# Patient Record
Sex: FEMALE | Race: WHITE | NOT HISPANIC OR LATINO | Employment: UNEMPLOYED | URBAN - METROPOLITAN AREA
[De-identification: names, ages, dates, MRNs, and addresses within clinical notes are randomized per-mention and may not be internally consistent; named-entity substitution may affect disease eponyms.]

---

## 2023-09-07 PROBLEM — O16.9 ELEVATED BLOOD PRESSURE AFFECTING PREGNANCY, ANTEPARTUM (HHS-HCC): Status: ACTIVE | Noted: 2023-09-07

## 2023-09-07 PROBLEM — N91.2 AMENORRHEA: Status: ACTIVE | Noted: 2023-09-07

## 2023-09-07 PROBLEM — Z86.32 HISTORY OF GESTATIONAL DIABETES: Status: ACTIVE | Noted: 2023-09-07

## 2023-09-07 PROBLEM — J02.9 EXUDATIVE PHARYNGITIS: Status: ACTIVE | Noted: 2023-09-07

## 2023-09-07 PROBLEM — O99.340 ANXIETY IN PREGNANCY, ANTEPARTUM (HHS-HCC): Status: ACTIVE | Noted: 2023-09-07

## 2023-09-07 PROBLEM — O24.414 INSULIN CONTROLLED GESTATIONAL DIABETES MELLITUS (GDM) DURING PREGNANCY, ANTEPARTUM (HHS-HCC): Status: ACTIVE | Noted: 2023-09-07

## 2023-09-07 PROBLEM — I82.409 DEEP VENOUS THROMBOSIS (MULTI): Status: ACTIVE | Noted: 2023-09-07

## 2023-09-07 PROBLEM — F41.9 ANXIETY IN PREGNANCY, ANTEPARTUM (HHS-HCC): Status: ACTIVE | Noted: 2023-09-07

## 2023-09-07 PROBLEM — D68.59 OTHER PRIMARY THROMBOPHILIA (MULTI): Status: ACTIVE | Noted: 2023-09-07

## 2023-09-07 PROBLEM — F41.8 DEPRESSION WITH ANXIETY: Status: ACTIVE | Noted: 2023-09-07

## 2023-09-07 PROBLEM — N92.0 EXCESSIVE AND FREQUENT MENSTRUATION WITH REGULAR CYCLE: Status: ACTIVE | Noted: 2023-09-07

## 2023-09-07 PROBLEM — D72.819 LEUKOPENIA: Status: ACTIVE | Noted: 2023-09-07

## 2023-09-07 PROBLEM — O22.30: Status: ACTIVE | Noted: 2023-09-07

## 2023-09-07 PROBLEM — O99.342 DEPRESSION AFFECTING PREGNANCY IN SECOND TRIMESTER, ANTEPARTUM (HHS-HCC): Status: ACTIVE | Noted: 2023-09-07

## 2023-09-07 PROBLEM — N92.1 MENOMETRORRHAGIA: Status: ACTIVE | Noted: 2023-09-07

## 2023-09-07 PROBLEM — O35.BXX0 ABNORMAL FETAL ECHOCARDIOGRAM AFFECTING ANTEPARTUM CARE OF MOTHER (HHS-HCC): Status: ACTIVE | Noted: 2023-09-07

## 2023-09-07 PROBLEM — E66.01 MORBID (SEVERE) OBESITY DUE TO EXCESS CALORIES (MULTI): Status: ACTIVE | Noted: 2023-09-07

## 2023-09-07 PROBLEM — F32.A DEPRESSION AFFECTING PREGNANCY IN SECOND TRIMESTER, ANTEPARTUM (HHS-HCC): Status: ACTIVE | Noted: 2023-09-07

## 2023-09-07 PROBLEM — D68.59 ANTITHROMBIN III DEFICIENCY (MULTI): Status: ACTIVE | Noted: 2023-09-07

## 2023-09-07 PROBLEM — M95.8 OSTEOCHONDRAL DEFECT OF FEMORAL CONDYLE: Status: ACTIVE | Noted: 2023-09-07

## 2023-09-07 PROBLEM — H92.09 OTALGIA: Status: ACTIVE | Noted: 2023-09-07

## 2023-09-07 PROBLEM — S83.005D PATELLAR DISLOCATION, LEFT, SUBSEQUENT ENCOUNTER: Status: ACTIVE | Noted: 2023-09-07

## 2023-09-07 RX ORDER — FLUOXETINE HYDROCHLORIDE 40 MG/1
1 CAPSULE ORAL DAILY
COMMUNITY

## 2023-09-07 RX ORDER — FERROUS SULFATE 325(65) MG
65 TABLET ORAL DAILY
COMMUNITY

## 2023-09-07 RX ORDER — BUPROPION HYDROCHLORIDE 200 MG/1
1 TABLET, EXTENDED RELEASE ORAL 2 TIMES DAILY
COMMUNITY
Start: 2022-03-16

## 2023-09-20 LAB
ANION GAP IN SER/PLAS: 14 MMOL/L (ref 10–20)
BASOPHILS (10*3/UL) IN BLOOD BY AUTOMATED COUNT: 0.04 X10E9/L (ref 0–0.1)
BASOPHILS/100 LEUKOCYTES IN BLOOD BY AUTOMATED COUNT: 0.7 % (ref 0–2)
CALCIUM (MG/DL) IN SER/PLAS: 9 MG/DL (ref 8.6–10.3)
CARBON DIOXIDE, TOTAL (MMOL/L) IN SER/PLAS: 26 MMOL/L (ref 21–32)
CHLORIDE (MMOL/L) IN SER/PLAS: 102 MMOL/L (ref 98–107)
CREATININE (MG/DL) IN SER/PLAS: 0.66 MG/DL (ref 0.5–1.05)
EOSINOPHILS (10*3/UL) IN BLOOD BY AUTOMATED COUNT: 0.16 X10E9/L (ref 0–0.7)
EOSINOPHILS/100 LEUKOCYTES IN BLOOD BY AUTOMATED COUNT: 2.8 % (ref 0–6)
ERYTHROCYTE DISTRIBUTION WIDTH (RATIO) BY AUTOMATED COUNT: 14.1 % (ref 11.5–14.5)
ERYTHROCYTE MEAN CORPUSCULAR HEMOGLOBIN CONCENTRATION (G/DL) BY AUTOMATED: 31.3 G/DL (ref 32–36)
ERYTHROCYTE MEAN CORPUSCULAR VOLUME (FL) BY AUTOMATED COUNT: 79 FL (ref 80–100)
ERYTHROCYTES (10*6/UL) IN BLOOD BY AUTOMATED COUNT: 5.19 X10E12/L (ref 4–5.2)
GFR FEMALE: >90 ML/MIN/1.73M2
GLUCOSE (MG/DL) IN SER/PLAS: 118 MG/DL (ref 74–99)
HEMATOCRIT (%) IN BLOOD BY AUTOMATED COUNT: 41.2 % (ref 36–46)
HEMOGLOBIN (G/DL) IN BLOOD: 12.9 G/DL (ref 12–16)
IMMATURE GRANULOCYTES/100 LEUKOCYTES IN BLOOD BY AUTOMATED COUNT: 0.5 % (ref 0–0.9)
LEUKOCYTES (10*3/UL) IN BLOOD BY AUTOMATED COUNT: 5.6 X10E9/L (ref 4.4–11.3)
LYMPHOCYTES (10*3/UL) IN BLOOD BY AUTOMATED COUNT: 1.86 X10E9/L (ref 1.2–4.8)
LYMPHOCYTES/100 LEUKOCYTES IN BLOOD BY AUTOMATED COUNT: 33 % (ref 13–44)
MONOCYTES (10*3/UL) IN BLOOD BY AUTOMATED COUNT: 0.33 X10E9/L (ref 0.1–1)
MONOCYTES/100 LEUKOCYTES IN BLOOD BY AUTOMATED COUNT: 5.9 % (ref 2–10)
NEUTROPHILS (10*3/UL) IN BLOOD BY AUTOMATED COUNT: 3.21 X10E9/L (ref 1.2–7.7)
NEUTROPHILS/100 LEUKOCYTES IN BLOOD BY AUTOMATED COUNT: 57.1 % (ref 40–80)
PLATELETS (10*3/UL) IN BLOOD AUTOMATED COUNT: 294 X10E9/L (ref 150–450)
POTASSIUM (MMOL/L) IN SER/PLAS: 4.3 MMOL/L (ref 3.5–5.3)
SODIUM (MMOL/L) IN SER/PLAS: 138 MMOL/L (ref 136–145)
UREA NITROGEN (MG/DL) IN SER/PLAS: 13 MG/DL (ref 6–23)

## 2023-09-27 ENCOUNTER — HOSPITAL ENCOUNTER (OUTPATIENT)
Dept: DATA CONVERSION | Facility: HOSPITAL | Age: 28
End: 2023-09-27
Attending: OBSTETRICS & GYNECOLOGY | Admitting: OBSTETRICS & GYNECOLOGY
Payer: COMMERCIAL

## 2023-09-27 DIAGNOSIS — N93.9 ABNORMAL UTERINE AND VAGINAL BLEEDING, UNSPECIFIED: ICD-10-CM

## 2023-09-27 DIAGNOSIS — D27.0 BENIGN NEOPLASM OF RIGHT OVARY: ICD-10-CM

## 2023-09-27 LAB
ABO GROUP (TYPE) IN BLOOD: NORMAL
ANTIBODY SCREEN: NORMAL
CHORIOGONADOTROPIN (MIU/ML) IN SER/PLAS: <2 MIU/ML
RH FACTOR: NORMAL

## 2023-09-29 VITALS
HEART RATE: 86 BPM | TEMPERATURE: 97.5 F | WEIGHT: 266.76 LBS | HEIGHT: 63 IN | BODY MASS INDEX: 47.27 KG/M2 | RESPIRATION RATE: 18 BRPM | DIASTOLIC BLOOD PRESSURE: 93 MMHG | SYSTOLIC BLOOD PRESSURE: 146 MMHG

## 2023-09-30 NOTE — H&P
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 21, recurrent DVT during pregnancy; suspected HOWARD - no sleep study; depression/anxiety, iron deficiency anemia  PSH: C-sections x2, ; L knee reconstruction   Med: eliquis 5mg BID, fluoxetine 40mg, multivitamin, PO iron, wellbutrin XL 300mg  All: NKDA  FH: unknown - adopted  Soc: denies x3    Allergies:        Allergies:  ·  No Known Allergies :     Home Medication Review:   Home Medications Reviewed: yes     Impression/Procedure:   ·  Impression and Planned Procedure: total robotic assisted hysterectomy and bilateral salpingectomy       ERAS (Enhanced Recovery After Surgery):  ·  ERAS Patient: no       Vital Signs:  Temperature C: 36.4 degrees C   Temperature F: 97.5 degrees F   Heart Rate: 86 beats per minute   Respiratory Rate: 18 breath per minute   Blood Pressure Systolic: 146 mm/Hg   Blood Pressure Diastolic: 93 mm/Hg     Physical Exam by System:    Constitutional: NAD   Eyes: Nonicteric   ENMT: MMM   Head/Neck: NCAT   Respiratory/Thorax: Breathing well on RA   Cardiovascular: Hemodynamically stable   Musculoskeletal: ROM grossly normal   Extremities: No LE edema   Neurological: Grossly intact   Psychological: Appropriate affect   Skin: WWP     Consent:   COVID-19 Consent:  ·  COVID-19 Risk Consent Surgeon has reviewed key risks related to the risk of halina COVID-19 and if they contract COVID-19 what the risks are.     Attestation:   Note Completion:  I am a:   Resident/Fellow   Attending Attestation I saw and evaluated the patient.  I personally obtained the key and critical portions of the history and physical exam or was physically present for key and  critical portions performed by the resident/fellow. I reviewed the resident/fellow?s documentation and discussed the patient with the resident/fellow.  I agree with the resident/fellow?s medical decision making as documented in the note.     I personally evaluated the patient on 27-Sep-2023         Electronic Signatures:  Marc Mccormack ()  (Signed 27-Sep-2023 12:35)   Authored: Note Completion   Co-Signer: History of Present Illness, Allergies, Home Medication Review, Impression/Procedure, ERAS, Physical Exam, Consent, Note Completion  Megan Mustafa (Resident))  (Signed 27-Sep-2023 11:54)   Authored: History of Present Illness, Allergies, Home  Medication Review, Impression/Procedure, ERAS, Physical Exam, Consent, Note Completion      Last Updated: 27-Sep-2023 12:35 by Marc Mccormack)

## 2023-10-01 NOTE — OP NOTE
Post Operative Note:     PreOp Diagnosis: Abnormal uterine bleeding   Post-Procedure Diagnosis: Abnormal uterine bleeding   Procedure: 1. Robotic assisted total laparoscopic  hysterectomy  2. Bilateral salpingectomy   Surgeon: LAURIE Mccormack   Resident/Fellow/Other Assistant: KAT Mustafa   Anesthesia: GETA   Estimated Blood Loss (mL): 100   Specimen: yes. uterus, cervix, bilateral fallopian  tubes   Complications: None   Findings: see below   Patient Returned To/Condition: PACU/Stable     Operative Report Dictated:  Dictation: not applicable - note contains Operative  Report   Operative Report:    Findings: uterus with mild adhesive disease anteriorly at site of previous Cesearean section, normal appearing fallopian tubes and ovaries    Preoperative heparin was given in the PACU.  Patient was taken to the operating room where time out was performed and general anesthesia was achieved.  She was positioned in dorsal lithotomy and prepped and draped.  A truong catheter was inserted into  the bladder.   The cervix was dilated and a V-care uterine manipulator was inserted into the uterus.    The abdomen was entered supraumbicially using a Veress needle. The abdomen was insufflated. The Veress was removed and a robotic 8mm port site was inserted. Abdominal entry was confirmed. Additional trocars were placed as follows: two in the left quadrant  and one in the right quadrant. The patient was placed in steep Trendelenburg.  The dINKi robotic surgical system was brought to the patient's bedside and docked.    The left fallopian tube was transected off the mesosalpinx using electrocautery. The utero-ovarian ligament was then transected. The round ligament was transected. The anterior peritoneum was opened and the bladder flap was created.  The left uterine  artery was skeletonized, cauterized, and transected at the level of the cup.  A similar procedure was performed on the right side.  A colpotomy was made circumferentially  following the contours of the cup.  The uterine specimen including the attached  tubes and ovaries was removed through the vagina.    The vaginal cuff was closed with a running stitch of 0 V-loc suture.  The pelvis was irrigated and all operative sites were found to be hemostatic.  All instruments were removed and the robot was taken from the patient's bedside.  The abdomen was desufflated  and all ports were removed.  The skin at all incisions was closed with 4-0 Vicryl in a subcuticular fashion followed by surgical glue.    Patient tolerated the procedure well.  Sponge, lap, and instrument counts were correct.  Patient received 3 gm of Ancef prior to skin incision for routine perioperative antibiotic prophylaxis. The truong was removed at the end of the case. She was extubated  and taken to the PACU in stable condition.    Dr. Mccormack was present and operating for all critical portions of the procedure.    Attestation:   Note Completion:  I am a: Resident/Fellow   Attending Attestation I was present for the entire procedure          Electronic Signatures:  Marc Mccormack ()  (Signed 27-Sep-2023 19:06)   Authored: Note Completion   Co-Signer: Post Operative Note, Note Completion  Megan Mustafa (Resident))  (Signed 27-Sep-2023 14:49)   Authored: Post Operative Note, Note Completion      Last Updated: 27-Sep-2023 19:06 by Marc Mccormack ()

## 2023-10-02 LAB
COMPLETE PATHOLOGY REPORT: NORMAL
CONVERTED CLINICAL DIAGNOSIS-HISTORY: NORMAL
CONVERTED FINAL DIAGNOSIS: NORMAL
CONVERTED FINAL REPORT PDF LINK TO COPY AND PASTE: NORMAL
CONVERTED GROSS DESCRIPTION: NORMAL

## 2023-10-10 ENCOUNTER — TELEPHONE (OUTPATIENT)
Dept: HEMATOLOGY/ONCOLOGY | Facility: CLINIC | Age: 28
End: 2023-10-10
Payer: COMMERCIAL

## 2023-10-12 ENCOUNTER — APPOINTMENT (OUTPATIENT)
Dept: OBSTETRICS AND GYNECOLOGY | Facility: CLINIC | Age: 28
End: 2023-10-12
Payer: COMMERCIAL

## 2023-10-17 ENCOUNTER — OFFICE VISIT (OUTPATIENT)
Dept: OBSTETRICS AND GYNECOLOGY | Facility: CLINIC | Age: 28
End: 2023-10-17
Payer: COMMERCIAL

## 2023-10-17 VITALS
BODY MASS INDEX: 48.37 KG/M2 | SYSTOLIC BLOOD PRESSURE: 112 MMHG | DIASTOLIC BLOOD PRESSURE: 76 MMHG | HEIGHT: 63 IN | WEIGHT: 273 LBS

## 2023-10-17 DIAGNOSIS — N93.8 DUB (DYSFUNCTIONAL UTERINE BLEEDING): Primary | ICD-10-CM

## 2023-10-17 PROCEDURE — 99024 POSTOP FOLLOW-UP VISIT: CPT | Performed by: OBSTETRICS & GYNECOLOGY

## 2023-10-17 PROCEDURE — 3074F SYST BP LT 130 MM HG: CPT | Performed by: OBSTETRICS & GYNECOLOGY

## 2023-10-17 PROCEDURE — 3078F DIAST BP <80 MM HG: CPT | Performed by: OBSTETRICS & GYNECOLOGY

## 2023-10-17 PROCEDURE — 1036F TOBACCO NON-USER: CPT | Performed by: OBSTETRICS & GYNECOLOGY

## 2023-10-17 NOTE — PROGRESS NOTES
Gabrielle Rosales is a 28 y.o. female who presents with a chief complaint of Post-op      SUBJECTIVE  Patient presents 2 weeks postop robotic hysterectomy with bilateral salpingectomy.  We went over her surgery and her pathology together.  Her incisions are clean and dry without erythema or drainage.  She like to lose a fair amount of weight because she is morbidly obese.  She was told to get body for life book and do as it says.  We discussed weight loss surgery.  She is in a follow-up in 3 months    Past Medical History:   Diagnosis Date    Other pericardial effusion (noninflammatory) 2021    Pericardial effusion    Other specified health status     No pertinent past medical history    Personal history of diseases of the blood and blood-forming organs and certain disorders involving the immune mechanism     History of bleeding disorder     Past Surgical History:   Procedure Laterality Date     SECTION, CLASSIC  2021     Section    HYSTERECTOMY      OTHER SURGICAL HISTORY  2021    Knee surgery     Social History     Socioeconomic History    Marital status: Single     Spouse name: None    Number of children: None    Years of education: None    Highest education level: None   Occupational History    None   Tobacco Use    Smoking status: Never    Smokeless tobacco: Never   Vaping Use    Vaping Use: Never used   Substance and Sexual Activity    Alcohol use: Not Currently    Drug use: Never    Sexual activity: Not Currently   Other Topics Concern    None   Social History Narrative    None     Social Determinants of Health     Financial Resource Strain: Not on file   Food Insecurity: Not on file   Transportation Needs: Not on file   Physical Activity: Not on file   Stress: Not on file   Social Connections: Not on file   Intimate Partner Violence: Not on file   Housing Stability: Not on file     Family History   Adopted: Yes   Problem Relation Name Age of Onset    No Known Problems  "Child      No Known Problems Other         OB History    Para Term  AB Living   2 2 1 1 0 2   SAB IAB Ectopic Multiple Live Births   0 0 0 0 2      # Outcome Date GA Lbr Erik/2nd Weight Sex Delivery Anes PTL Lv   2             1 Term                OBJECTIVE  No Known Allergies   (Not in a hospital admission)       Review of Systems  History obtained from the patient  General ROS: negative  Gastrointestinal ROS: no abdominal pain, change in bowel habits, or black or bloody stools  Musculoskeletal ROS: negative  Physical Exam  General Appearance: awake, alert, oriented, in no acute distress, well developed, well nourished, and in no acute distress  Skin: there are no suspicious lesions or rashes of concern, skin color, texture, turgor are normal; there are no bruises, rashes or lesions.  Head/Face: NCAT  Eyes: No gross abnormalities., PERRL, and EOMI  Abdomen: Soft, non-tender, normal bowel sounds; no bruits, organomegaly or masses.  Extremities: Extremities warm to touch, pink, with no edema.  Musculoskeletal: negative    /76   Ht 1.6 m (5' 3\")   Wt 124 kg (273 lb)   LMP 2023   BMI 48.36 kg/m²    Problem List Items Addressed This Visit    None  Visit Diagnoses       DUB (dysfunctional uterine bleeding)    -  Primary               "

## 2023-11-07 ENCOUNTER — APPOINTMENT (OUTPATIENT)
Dept: OBSTETRICS AND GYNECOLOGY | Facility: CLINIC | Age: 28
End: 2023-11-07
Payer: COMMERCIAL

## 2024-01-18 ENCOUNTER — APPOINTMENT (OUTPATIENT)
Dept: OBSTETRICS AND GYNECOLOGY | Facility: CLINIC | Age: 29
End: 2024-01-18
Payer: COMMERCIAL

## 2024-02-01 ENCOUNTER — TELEPHONE (OUTPATIENT)
Dept: HEMATOLOGY/ONCOLOGY | Facility: CLINIC | Age: 29
End: 2024-02-01
Payer: COMMERCIAL

## 2024-02-01 NOTE — TELEPHONE ENCOUNTER
Message sent to Dr. Marie for advice.    Per Dr. Ko:   Not concerned about the bruising as patient is on Eliquis.  Repeat SWATI labs on 2-8-24.  Left a detailed message for Gabrielle with this information.

## 2024-02-02 DIAGNOSIS — D68.59 ANTITHROMBIN III DEFICIENCY (MULTI): Primary | ICD-10-CM

## 2024-02-13 ENCOUNTER — LAB (OUTPATIENT)
Dept: LAB | Facility: LAB | Age: 29
End: 2024-02-13
Payer: COMMERCIAL

## 2024-02-13 DIAGNOSIS — D68.59 ANTITHROMBIN III DEFICIENCY (MULTI): ICD-10-CM

## 2024-02-13 LAB
ALBUMIN SERPL BCP-MCNC: 4.3 G/DL (ref 3.4–5)
ALP SERPL-CCNC: 60 U/L (ref 33–110)
ALT SERPL W P-5'-P-CCNC: 42 U/L (ref 7–45)
ANION GAP SERPL CALC-SCNC: 15 MMOL/L (ref 10–20)
APTT PPP: 38 SECONDS (ref 27–38)
AST SERPL W P-5'-P-CCNC: 21 U/L (ref 9–39)
BASOPHILS # BLD AUTO: 0.04 X10*3/UL (ref 0–0.1)
BASOPHILS NFR BLD AUTO: 0.7 %
BILIRUB SERPL-MCNC: 0.5 MG/DL (ref 0–1.2)
BUN SERPL-MCNC: 15 MG/DL (ref 6–23)
CALCIUM SERPL-MCNC: 9.7 MG/DL (ref 8.6–10.6)
CHLORIDE SERPL-SCNC: 102 MMOL/L (ref 98–107)
CO2 SERPL-SCNC: 27 MMOL/L (ref 21–32)
CREAT SERPL-MCNC: 0.67 MG/DL (ref 0.5–1.05)
EGFRCR SERPLBLD CKD-EPI 2021: >90 ML/MIN/1.73M*2
EOSINOPHIL # BLD AUTO: 0.14 X10*3/UL (ref 0–0.7)
EOSINOPHIL NFR BLD AUTO: 2.4 %
ERYTHROCYTE [DISTWIDTH] IN BLOOD BY AUTOMATED COUNT: 14.5 % (ref 11.5–14.5)
FERRITIN SERPL-MCNC: 28 NG/ML (ref 8–150)
GLUCOSE SERPL-MCNC: 121 MG/DL (ref 74–99)
HCT VFR BLD AUTO: 46.7 % (ref 36–46)
HGB BLD-MCNC: 14.3 G/DL (ref 12–16)
IMM GRANULOCYTES # BLD AUTO: 0.08 X10*3/UL (ref 0–0.7)
IMM GRANULOCYTES NFR BLD AUTO: 1.4 % (ref 0–0.9)
INR PPP: 1.1 (ref 0.9–1.1)
IRON SATN MFR SERPL: 13 % (ref 25–45)
IRON SERPL-MCNC: 53 UG/DL (ref 35–150)
LYMPHOCYTES # BLD AUTO: 1.97 X10*3/UL (ref 1.2–4.8)
LYMPHOCYTES NFR BLD AUTO: 34.1 %
MCH RBC QN AUTO: 24.8 PG (ref 26–34)
MCHC RBC AUTO-ENTMCNC: 30.6 G/DL (ref 32–36)
MCV RBC AUTO: 81 FL (ref 80–100)
MONOCYTES # BLD AUTO: 0.35 X10*3/UL (ref 0.1–1)
MONOCYTES NFR BLD AUTO: 6.1 %
NEUTROPHILS # BLD AUTO: 3.2 X10*3/UL (ref 1.2–7.7)
NEUTROPHILS NFR BLD AUTO: 55.3 %
NRBC BLD-RTO: 0 /100 WBCS (ref 0–0)
PLATELET # BLD AUTO: 316 X10*3/UL (ref 150–450)
POTASSIUM SERPL-SCNC: 4.5 MMOL/L (ref 3.5–5.3)
PROT SERPL-MCNC: 6.9 G/DL (ref 6.4–8.2)
PROTHROMBIN TIME: 12.2 SECONDS (ref 9.8–12.8)
RBC # BLD AUTO: 5.76 X10*6/UL (ref 4–5.2)
SODIUM SERPL-SCNC: 139 MMOL/L (ref 136–145)
TIBC SERPL-MCNC: 418 UG/DL (ref 240–445)
UIBC SERPL-MCNC: 365 UG/DL (ref 110–370)
VIT B12 SERPL-MCNC: 467 PG/ML (ref 211–911)
WBC # BLD AUTO: 5.8 X10*3/UL (ref 4.4–11.3)

## 2024-02-13 PROCEDURE — 83550 IRON BINDING TEST: CPT

## 2024-02-13 PROCEDURE — 82607 VITAMIN B-12: CPT

## 2024-02-13 PROCEDURE — 80053 COMPREHEN METABOLIC PANEL: CPT

## 2024-02-13 PROCEDURE — 85730 THROMBOPLASTIN TIME PARTIAL: CPT

## 2024-02-13 PROCEDURE — 85025 COMPLETE CBC W/AUTO DIFF WBC: CPT

## 2024-02-13 PROCEDURE — 82728 ASSAY OF FERRITIN: CPT

## 2024-02-13 PROCEDURE — 83540 ASSAY OF IRON: CPT

## 2024-02-13 PROCEDURE — 85610 PROTHROMBIN TIME: CPT

## 2024-02-13 PROCEDURE — 36415 COLL VENOUS BLD VENIPUNCTURE: CPT

## 2024-03-26 NOTE — PROGRESS NOTES
Patient ID: Gabrielle Rosales is a 28 y.o. female.    Subjective    HPI  Gabrielle Rosales follows up for ATIII deficiency and recurrent left lower extremity DVT, has been on Eliquis 5 mg twice daily.  She is noting that with eliquis her  periods are heavier.  She is taking oral iron, OTC  She has no pagophagia, occasional chills, no koilynychia, +hair loss, no tongue soreness.    She has no leg symptoms.  She has some SOB with continued weight gain.     Having a partial hysterectomy on Thursday.  She is on lovenox bridge.        DIAGNOSIS: Anti-thrombin 3 deficiency, left lower extremity DVT in , provoked by surgery again on  2021, provoked by pregnancy.      28-year-old woman who presented to the emergency room on September 3, 2021, with complaints of left lower extremity pain and was diagnosed with a left lower extremity DVT. She was initiated on Lovenox 40 mg b.I.d. The patient saw her primary care provider  on 2021, for refill on Lovenox. Dose was increased to 100 mg twice daily and the patient was referred to our office.  Subsequent dose 120 mg twice daily.  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 She was on Xarelto for time following surgery.  She self-discontinued this and was never seen in follow-up.     Gabrielle Rosales has a history of ATIII deficiency and left leg DVT during pregnancy, diagnosed in 2021, for which she is on Lovenox 120 mg subcu twice daily.  In  she developed intense chest and back pains that would like labor  pains.  She had hypertension and proteinuria and was diagnosed with HELLP syndrome.  She was delivered early at 33 weeks on 2022 by .  Her postoperative course was complicated by by a wound infection and she currently has no wound  and is on the wound VAC.   However she denies fever or chills.  She continues on Lovenox tomogram subcu twice daily, which she is tolerating without bleeding.  She denies left leg swelling redness or pain.  On February 11, 2022 duplex ultrasound revealed  thrombosis present in the left popliteal vein, and a slightly different location from previous imaging.  Objective    BSA: There is no height or weight on file to calculate BSA.  LMP 08/13/2023    Review of Systems   Constitutional:  Positive for fatigue. Negative for unexpected weight change.   HENT:  Negative for nosebleeds.    Eyes:  Negative for visual disturbance.   Respiratory:  Positive for shortness of breath.    Cardiovascular:  Negative for leg swelling.   Gastrointestinal:  Negative for blood in stool.   Endocrine: Negative for cold intolerance.   Genitourinary:  Negative for dysuria.   Musculoskeletal:  Negative for back pain.   Skin:  Negative for rash.   Allergic/Immunologic: Negative for environmental allergies.   Neurological:  Positive for headaches.   Hematological:  Bruises/bleeds easily.   Psychiatric/Behavioral:  Negative for sleep disturbance.        Physical Exam  Constitutional:       Appearance: She is obese.   HENT:      Head: Normocephalic and atraumatic.      Right Ear: External ear normal.      Left Ear: External ear normal.      Nose: Nose normal.      Mouth/Throat:      Mouth: Mucous membranes are moist.      Pharynx: Oropharynx is clear.   Eyes:      Extraocular Movements: Extraocular movements intact.      Pupils: Pupils are equal, round, and reactive to light.   Cardiovascular:      Rate and Rhythm: Normal rate and regular rhythm.      Heart sounds: No murmur heard.     No friction rub. No gallop.   Pulmonary:      Effort: Pulmonary effort is normal.   Abdominal:      General: Abdomen is flat. Bowel sounds are normal.   Musculoskeletal:         General: Normal range of motion.      Cervical back: Normal range of motion.   Skin:     General: Skin is warm.    Neurological:      General: No focal deficit present.      Mental Status: She is alert and oriented to person, place, and time.   Psychiatric:         Mood and Affect: Mood normal.         Behavior: Behavior normal.             Assessment/Plan     SUMMARY: A 28-year-old woman with AT3 deficiency and recurrent left lower extremity DVT, initially provoked by arthroscopic surgery in  and then recurrent left  leg DVT in 2021 provoked by pregnancy. She has anti-thrombin III deficiency with a level of 66%, protein S normal at 117%, protein C is 81%. She was on Lovenox 120 mg twice daily during pregnancy.  In  she developed hypertension,  proteinuria, and HELLP syndrome and was delivered early at 33 weeks by  on 2022.  Her postoperative course has been complicated by a wound infection and she has an open wound in the back at the current time.  OB/GYN, Dr. Marc Wilkerson at [facility]     ASSESSMENT/PLAN:     1. Anti-thrombin III deficiency.    She was diagnosed with initial left leg DVT provoked by surgery  then recurrent left leg DVT in 2021 provoked by pregnancy.  A follow-up ultrasound on 2022 revealed persistent presence of left proximal popliteal vein deep venous  thrombosis, and location slightly different from the prior study.    Family history: She is adopted and therefore we do not know the family history.    She denies progressive left leg DVT symptoms.   Anticoagulation:  During pregnancy she was on Lovenox 120 mg subcu twice daily.    Currently: Eliquis 5 mg twice daily.    Discussions: Previously we discussed continuing full dose anticoagulation as long as there is infection.  Afterwards, consideration should be given to continuous low-dose Eliquis.  Currently postdelivery.  Infection has resolved.  She is showing signs of heavy periods and iron deficiency.  Therefore we discussed Eliquis 2.5 mg twice daily.    PLAN:    Continue Eliquis   Change to 2.5 mg twice daily.    Return to clinic (for this problem) every 6 months     2. Bruising.  Not having at present.  Previously, we performed a XA level while patient is on Lovenox and XA level returned at 0.72 from 2021.  PLAN: Continue to follow     3.  Iron deficiency anemia.  Status post 3 doses of Venofer 300 mg.  On 2022, ferritin 39, 13% iron saturation.  On 2022, ferritin 30, 7% iron saturation, TIBC 399.  She notes heavy periods while on Eliquis.  Therefore, will need to increase our attention to her iron status by seeing her every 3 months.  Ferritin 24(2023) TIBC 427, 3% iron saturation.  Despite taking oral iron, ferritin 28, TIBC 418 on 24  Plan:   IV iron  Continue oral iron every other day.    Repeat ferritin and iron panel and CBC.    Return to clinic in 6 months        4.Recurrent left leg DVT.  Initial left leg deep venous thrombosis in  provoked by arthroscopic surgery.  Recurrent left leg deep venous thrombosis in  provoked by pregnancy.  Presence of AT3 deficiency.  Discussion as above.       4.HELLP syndrome.  Patient developed proteinuria and hypertension in 2021 to and was delivered early by  on 2022.  She appears to have recovered.  Her blood pressure is controlled.  There is no jaundice.  She denies abdominal  pain.  There are no CNS findings on exam today.  CBC today reveals no thrombocytopenia.  Her hemoglobin is improved.  Potential complications of HELLP syndrome include bleeding, DIC, acute renal failure, pulmonary edema, subcapsular liver hematoma, retinal detachment.  The patient is not having kidney failure.  She denies shortness of breath.  On 2020 ALT 89,  AST 39, normal ALK.  Concern for MILNER.  The risk of recurrent HELLP syndrome is 3 to 5%[[facility] , 56(2), 93-96.]  The patient is thinking of having hysterectomy with Dr.David Mccoramck,  DO  Plan: Continue to follow     5. elevated uric of 7.9.  No history of gout.  Causes of hyperuricemia include etoh, G6PD deficiency, MPN, obesity, psoriasis, and B12 deficiency.   Note that the patient is obese.  Plan: Continue to follow     6.  Leukopenia.  White blood count 3.7 with absolute lymphocyte count 830 cells per microliter.The most common causes for lymphocytopenia are alcohol exposure, steroids, treatment with chemotherapy, and B12 deficiency.  Other potential causes of lymphocytopenia  include TB infection, viral infection (notably HIV), elevated KEVIN, RA, and on rare occasions zinc deficiency, malignancy (with DIC) and lymphoma.  The patient denies a history of recurrent sinopulmonary infections; however, we could assess for immunoglobulin  deficiency by quantitative immunoglobulins.  On April 1, 2022, flow cytometry revealed no lymphoproliferative disorder.  Copper 134(6/30/2022)   Zinc 76   Rheumatoid factor undetectable, KEVIN negative, HIV nonreactive  Flow cytometry(4/1/2022) negative  Status: Resolved     Right eye pain, starting in March, 2024  Tender to touch  Not red  PLAN  CT orbits  ENT visit, MD Bob Silverman MD

## 2024-03-27 ENCOUNTER — OFFICE VISIT (OUTPATIENT)
Dept: HEMATOLOGY/ONCOLOGY | Facility: CLINIC | Age: 29
End: 2024-03-27
Payer: COMMERCIAL

## 2024-03-27 VITALS
DIASTOLIC BLOOD PRESSURE: 84 MMHG | WEIGHT: 273.26 LBS | HEART RATE: 103 BPM | OXYGEN SATURATION: 95 % | RESPIRATION RATE: 18 BRPM | BODY MASS INDEX: 48.42 KG/M2 | TEMPERATURE: 97.5 F | SYSTOLIC BLOOD PRESSURE: 137 MMHG | HEIGHT: 63 IN

## 2024-03-27 DIAGNOSIS — D68.59 ANTITHROMBIN III DEFICIENCY (MULTI): Primary | ICD-10-CM

## 2024-03-27 PROCEDURE — 3075F SYST BP GE 130 - 139MM HG: CPT | Performed by: INTERNAL MEDICINE

## 2024-03-27 PROCEDURE — 99214 OFFICE O/P EST MOD 30 MIN: CPT | Performed by: INTERNAL MEDICINE

## 2024-03-27 PROCEDURE — 3079F DIAST BP 80-89 MM HG: CPT | Performed by: INTERNAL MEDICINE

## 2024-03-27 RX ORDER — DIPHENHYDRAMINE HYDROCHLORIDE 50 MG/ML
50 INJECTION INTRAMUSCULAR; INTRAVENOUS AS NEEDED
Status: CANCELLED | OUTPATIENT
Start: 2024-04-03

## 2024-03-27 RX ORDER — ALBUTEROL SULFATE 0.83 MG/ML
3 SOLUTION RESPIRATORY (INHALATION) AS NEEDED
Status: CANCELLED | OUTPATIENT
Start: 2024-04-03

## 2024-03-27 RX ORDER — EPINEPHRINE 0.3 MG/.3ML
0.3 INJECTION SUBCUTANEOUS EVERY 5 MIN PRN
Status: CANCELLED | OUTPATIENT
Start: 2024-04-03

## 2024-03-27 RX ORDER — FAMOTIDINE 10 MG/ML
20 INJECTION INTRAVENOUS ONCE AS NEEDED
Status: CANCELLED | OUTPATIENT
Start: 2024-04-03

## 2024-03-27 RX ORDER — HEPARIN 100 UNIT/ML
500 SYRINGE INTRAVENOUS AS NEEDED
Status: CANCELLED | OUTPATIENT
Start: 2024-03-27

## 2024-03-27 RX ORDER — HEPARIN SODIUM,PORCINE/PF 10 UNIT/ML
50 SYRINGE (ML) INTRAVENOUS AS NEEDED
Status: CANCELLED | OUTPATIENT
Start: 2024-03-27

## 2024-03-27 ASSESSMENT — ENCOUNTER SYMPTOMS
BLOOD IN STOOL: 0
BRUISES/BLEEDS EASILY: 1
DYSURIA: 0
BACK PAIN: 0
HEADACHES: 1
SLEEP DISTURBANCE: 0
UNEXPECTED WEIGHT CHANGE: 0
FATIGUE: 1
SHORTNESS OF BREATH: 1

## 2024-03-27 ASSESSMENT — PAIN SCALES - GENERAL: PAINLEVEL: 0-NO PAIN

## 2024-03-27 ASSESSMENT — PATIENT HEALTH QUESTIONNAIRE - PHQ9
SUM OF ALL RESPONSES TO PHQ9 QUESTIONS 1 AND 2: 0
2. FEELING DOWN, DEPRESSED OR HOPELESS: NOT AT ALL
1. LITTLE INTEREST OR PLEASURE IN DOING THINGS: NOT AT ALL

## 2024-03-27 ASSESSMENT — COLUMBIA-SUICIDE SEVERITY RATING SCALE - C-SSRS
1. IN THE PAST MONTH, HAVE YOU WISHED YOU WERE DEAD OR WISHED YOU COULD GO TO SLEEP AND NOT WAKE UP?: NO
6. HAVE YOU EVER DONE ANYTHING, STARTED TO DO ANYTHING, OR PREPARED TO DO ANYTHING TO END YOUR LIFE?: NO
2. HAVE YOU ACTUALLY HAD ANY THOUGHTS OF KILLING YOURSELF?: NO

## 2024-03-27 NOTE — PROGRESS NOTES
Patient here for follow up with Dr. Marie; seen for DVT.    Reconciled and reviewed medication and allergy list with patient.    Patient has pain by her lower eye for which she has a NPV with Dr. Moreno Mclean.  MD aware    Confirmed patient continues on Eliquis as prescribed.    Per MD, patient to follow up in 3 months and MD ordered IV iron.    Reviewed AVS with patient.      No barriers to education noted, pt. Agreed to plan and verbalized understanding using teach back method

## 2024-04-01 ENCOUNTER — TELEPHONE (OUTPATIENT)
Dept: HEMATOLOGY/ONCOLOGY | Facility: CLINIC | Age: 29
End: 2024-04-01
Payer: COMMERCIAL

## 2024-04-01 DIAGNOSIS — D68.59 ANTITHROMBIN III DEFICIENCY (MULTI): Primary | ICD-10-CM

## 2024-04-12 ENCOUNTER — APPOINTMENT (OUTPATIENT)
Dept: HEMATOLOGY/ONCOLOGY | Facility: CLINIC | Age: 29
End: 2024-04-12
Payer: COMMERCIAL

## 2024-04-17 ENCOUNTER — OFFICE VISIT (OUTPATIENT)
Dept: OTOLARYNGOLOGY | Facility: CLINIC | Age: 29
End: 2024-04-17
Payer: COMMERCIAL

## 2024-04-17 VITALS
BODY MASS INDEX: 48.37 KG/M2 | TEMPERATURE: 97.6 F | DIASTOLIC BLOOD PRESSURE: 86 MMHG | SYSTOLIC BLOOD PRESSURE: 121 MMHG | HEIGHT: 63 IN | WEIGHT: 273 LBS

## 2024-04-17 DIAGNOSIS — J35.1 CHRONIC TONSILLAR HYPERTROPHY: ICD-10-CM

## 2024-04-17 DIAGNOSIS — J03.90 ACUTE TONSILLITIS, UNSPECIFIED ETIOLOGY: Primary | ICD-10-CM

## 2024-04-17 PROCEDURE — 3079F DIAST BP 80-89 MM HG: CPT | Performed by: OTOLARYNGOLOGY

## 2024-04-17 PROCEDURE — 3074F SYST BP LT 130 MM HG: CPT | Performed by: OTOLARYNGOLOGY

## 2024-04-17 PROCEDURE — 1036F TOBACCO NON-USER: CPT | Performed by: OTOLARYNGOLOGY

## 2024-04-17 PROCEDURE — 99214 OFFICE O/P EST MOD 30 MIN: CPT | Performed by: OTOLARYNGOLOGY

## 2024-04-17 RX ORDER — PREDNISONE 10 MG/1
TABLET ORAL
Qty: 51 TABLET | Refills: 0 | Status: SHIPPED | OUTPATIENT
Start: 2024-04-17 | End: 2024-05-03 | Stop reason: ALTCHOICE

## 2024-04-17 RX ORDER — AMOXICILLIN AND CLAVULANATE POTASSIUM 875; 125 MG/1; MG/1
875 TABLET, FILM COATED ORAL 2 TIMES DAILY
Qty: 20 TABLET | Refills: 0 | Status: SHIPPED | OUTPATIENT
Start: 2024-04-17 | End: 2024-05-03 | Stop reason: ALTCHOICE

## 2024-04-17 NOTE — PROGRESS NOTES
Impression:  1. Acute tonsillitis, unspecified etiology        2. Chronic tonsillar hypertrophy             RECOMMENDATIONS/PLAN :  We will start her on Augmentin for the next 10 days as well as some oral prednisone for the next 14 days.  She will continue to keep yourself well-hydrated and drink plenty of water and Gatorade and she can do some salt water gargles.  At this point I do not recommend any surgical intervention.  If her condition worsens, she will go through the emergency room and receive IV antibiotics and steroids.  All of her concerns were addressed today.      **This electronic medical record note was created with the use of voice recognition software.  Despite proofreading, typographical or grammatical errors may be present that could affect meaning of content **    Subjective   Patient ID:     Gabrielle Rosales is a 28 y.o. female who presents to the office today complaining of acute swelling of her tonsils and she feels like they are nearly kissing in the midline.  She denies any high fever or chills and she is still eating and drinking normally.  She has had a few tonsil infections in the past however nothing repetitive.  Apparently she has seen other ear nose and throat specialists and they did not recommend any surgery due to her past medical history.  She denies any neck stiffness or any neurologic complaints.  She further denies any stridor or difficulty breathing.    ROS:  A detailed 12 system review of systems is noted on the intake form has been reviewed with the patient with details noted in the HPI and scanned into the patient's medical record.    Objective     Past Medical History:   Diagnosis Date    Other pericardial effusion (noninflammatory) ([facility]) 12/17/2021    Pericardial effusion    Other specified health status     No pertinent past medical history    Personal history of diseases of the blood and blood-forming organs and certain disorders involving the immune mechanism      "History of bleeding disorder        Past Surgical History:   Procedure Laterality Date     SECTION, CLASSIC  2021     Section    HYSTERECTOMY      OTHER SURGICAL HISTORY  2021    Knee surgery        No Known Allergies       Current Outpatient Medications:     apixaban (Eliquis) 5 mg tablet, Take 0.5 tablets (2.5 mg) by mouth 2 times a day., Disp: 90 tablet, Rfl: 3    buPROPion SR (Wellbutrin SR) 200 mg 12 hr tablet, Take 1 tablet (200 mg) by mouth 2 times a day., Disp: , Rfl:     ferrous sulfate 325 (65 Fe) MG tablet, Take 1 tablet by mouth once daily. for 30 day(s), Disp: , Rfl:     FLUoxetine (PROzac) 40 mg capsule, Take 1 capsule (40 mg) by mouth once daily., Disp: , Rfl:     MULTIVITAMIN ORAL, 1 tablet once daily. for 30 day(s), Disp: , Rfl:      Tobacco Use: Low Risk  (2024)    Patient History     Smoking Tobacco Use: Never     Smokeless Tobacco Use: Never     Passive Exposure: Not on file        Alcohol Use: Not on file        Social History     Substance and Sexual Activity   Drug Use Never        Physical Exam:  Visit Vitals  /86   Temp 36.4 °C (97.6 °F) (Temporal)   Ht 1.6 m (5' 3\")   Wt 124 kg (273 lb)   LMP 2023   BMI 48.36 kg/m²   OB Status Hysterectomy   Smoking Status Never   BSA 2.35 m²      General: Patient is alert, oriented, cooperative in no apparent distress.  Head: Normocephalic, atraumatic.  Eyes: PERRL, EOMI, Conjunctiva is clear. No nystagmus.  Ears: Right Ear-- Pinna is normal.  External auditory canal is patent. Tympanic membrane is [intact, translucent and has good mobility with my pneumatic otoscope. No effusion].  Mastoid is nontender.  Left ear-- Pinna is normal.  External auditory canal is patent. Tympanic membrane is [intact, translucent and has good mobility with my pneumatic otoscope.  No effusion].  Mastoid is nontender.  Nose: Septum is straight.  No septal perforation or lesions. No septal hematoma/ seroma.  No signs of bleeding.  " Inferior turbinates are normal.   No evidence of intranasal polyps.  No infectious drainage.  Throat:  Floor of mouth is clear, no masses.  Tongue appears normal, no lesions or masses. Gums, gingiva, buccal mucosa appear pink and moist, no lesions. Teeth are in good repair.  No obvious dental infections.  Peritonsillar regions appear symmetric without swelling.  Hard and soft palate appear normal, no obvious cleft. Uvula is midline.  Left Tonsil --+4, with mild exudate  Right Tonsil --+4, with mild exudate  Oropharynx: No lesions. Retropharyngeal wall is flat.  No active postnasal drip.  Neck: Supple,  no lymphadenopathy.  No masses.  Salivary Glands: Symmetric bilaterally.  No palpable masses.  No evidence of acute infection or salivary stones  Neurologic: Cranial Nerves 2-12 are grossly intact without focal deficits. Cerebellar function testing is normal.     Results:   []    Procedure:   []    Moreno Moreno, DO

## 2024-04-19 ENCOUNTER — APPOINTMENT (OUTPATIENT)
Dept: HEMATOLOGY/ONCOLOGY | Facility: CLINIC | Age: 29
End: 2024-04-19
Payer: COMMERCIAL

## 2024-05-03 ENCOUNTER — OFFICE VISIT (OUTPATIENT)
Dept: PRIMARY CARE | Facility: CLINIC | Age: 29
End: 2024-05-03
Payer: COMMERCIAL

## 2024-05-03 ENCOUNTER — LAB (OUTPATIENT)
Dept: LAB | Facility: LAB | Age: 29
End: 2024-05-03
Payer: COMMERCIAL

## 2024-05-03 VITALS
SYSTOLIC BLOOD PRESSURE: 130 MMHG | OXYGEN SATURATION: 97 % | RESPIRATION RATE: 18 BRPM | HEIGHT: 63 IN | BODY MASS INDEX: 47.45 KG/M2 | HEART RATE: 106 BPM | DIASTOLIC BLOOD PRESSURE: 84 MMHG | TEMPERATURE: 97.6 F | WEIGHT: 267.8 LBS

## 2024-05-03 DIAGNOSIS — R53.82 CHRONIC FATIGUE: ICD-10-CM

## 2024-05-03 DIAGNOSIS — Z76.89 ENCOUNTER TO ESTABLISH CARE WITH NEW DOCTOR: Primary | ICD-10-CM

## 2024-05-03 DIAGNOSIS — G47.00 INSOMNIA, UNSPECIFIED TYPE: ICD-10-CM

## 2024-05-03 DIAGNOSIS — R06.00 DYSPNEA, UNSPECIFIED TYPE: ICD-10-CM

## 2024-05-03 DIAGNOSIS — G47.30 SLEEP APNEA, UNSPECIFIED TYPE: ICD-10-CM

## 2024-05-03 LAB — TSH SERPL-ACNC: 2.99 MIU/L (ref 0.44–3.98)

## 2024-05-03 PROCEDURE — 1036F TOBACCO NON-USER: CPT | Performed by: FAMILY MEDICINE

## 2024-05-03 PROCEDURE — 99214 OFFICE O/P EST MOD 30 MIN: CPT | Performed by: FAMILY MEDICINE

## 2024-05-03 PROCEDURE — 82306 VITAMIN D 25 HYDROXY: CPT

## 2024-05-03 PROCEDURE — 3075F SYST BP GE 130 - 139MM HG: CPT | Performed by: FAMILY MEDICINE

## 2024-05-03 PROCEDURE — 3079F DIAST BP 80-89 MM HG: CPT | Performed by: FAMILY MEDICINE

## 2024-05-03 PROCEDURE — 36415 COLL VENOUS BLD VENIPUNCTURE: CPT

## 2024-05-03 PROCEDURE — 84443 ASSAY THYROID STIM HORMONE: CPT

## 2024-05-03 ASSESSMENT — PATIENT HEALTH QUESTIONNAIRE - PHQ9
8. MOVING OR SPEAKING SO SLOWLY THAT OTHER PEOPLE COULD HAVE NOTICED. OR THE OPPOSITE, BEING SO FIGETY OR RESTLESS THAT YOU HAVE BEEN MOVING AROUND A LOT MORE THAN USUAL: SEVERAL DAYS
2. FEELING DOWN, DEPRESSED OR HOPELESS: MORE THAN HALF THE DAYS
10. IF YOU CHECKED OFF ANY PROBLEMS, HOW DIFFICULT HAVE THESE PROBLEMS MADE IT FOR YOU TO DO YOUR WORK, TAKE CARE OF THINGS AT HOME, OR GET ALONG WITH OTHER PEOPLE: NOT DIFFICULT AT ALL
7. TROUBLE CONCENTRATING ON THINGS, SUCH AS READING THE NEWSPAPER OR WATCHING TELEVISION: NEARLY EVERY DAY
3. TROUBLE FALLING OR STAYING ASLEEP: NEARLY EVERY DAY
4. FEELING TIRED OR HAVING LITTLE ENERGY: SEVERAL DAYS
5. POOR APPETITE OR OVEREATING: NEARLY EVERY DAY
9. THOUGHTS THAT YOU WOULD BE BETTER OFF DEAD, OR OF HURTING YOURSELF: NOT AT ALL
6. FEELING BAD ABOUT YOURSELF - OR THAT YOU ARE A FAILURE OR HAVE LET YOURSELF OR YOUR FAMILY DOWN: NEARLY EVERY DAY
SUM OF ALL RESPONSES TO PHQ9 QUESTIONS 1 & 2: 3
1. LITTLE INTEREST OR PLEASURE IN DOING THINGS: SEVERAL DAYS
SUM OF ALL RESPONSES TO PHQ QUESTIONS 1-9: 17

## 2024-05-03 ASSESSMENT — ENCOUNTER SYMPTOMS
LOSS OF SENSATION IN FEET: 0
DEPRESSION: 0
OCCASIONAL FEELINGS OF UNSTEADINESS: 0

## 2024-05-03 ASSESSMENT — ANXIETY QUESTIONNAIRES
2. NOT BEING ABLE TO STOP OR CONTROL WORRYING: MORE THAN HALF THE DAYS
1. FEELING NERVOUS, ANXIOUS, OR ON EDGE: SEVERAL DAYS
3. WORRYING TOO MUCH ABOUT DIFFERENT THINGS: NEARLY EVERY DAY
7. FEELING AFRAID AS IF SOMETHING AWFUL MIGHT HAPPEN: NOT AT ALL
IF YOU CHECKED OFF ANY PROBLEMS ON THIS QUESTIONNAIRE, HOW DIFFICULT HAVE THESE PROBLEMS MADE IT FOR YOU TO DO YOUR WORK, TAKE CARE OF THINGS AT HOME, OR GET ALONG WITH OTHER PEOPLE: SOMEWHAT DIFFICULT
4. TROUBLE RELAXING: SEVERAL DAYS
GAD7 TOTAL SCORE: 9
5. BEING SO RESTLESS THAT IT IS HARD TO SIT STILL: SEVERAL DAYS
6. BECOMING EASILY ANNOYED OR IRRITABLE: SEVERAL DAYS

## 2024-05-03 ASSESSMENT — COLUMBIA-SUICIDE SEVERITY RATING SCALE - C-SSRS
2. HAVE YOU ACTUALLY HAD ANY THOUGHTS OF KILLING YOURSELF?: NO
6. HAVE YOU EVER DONE ANYTHING, STARTED TO DO ANYTHING, OR PREPARED TO DO ANYTHING TO END YOUR LIFE?: NO

## 2024-05-03 ASSESSMENT — PAIN SCALES - GENERAL: PAINLEVEL: 0-NO PAIN

## 2024-05-03 NOTE — PROGRESS NOTES
Outpatient Visit Note    Chief Complaint   Patient presents with    Insomnia       HPI:  Gabrielle Rosales is a 28 y.o. female here  She presents today with concerns regarding insomnia.  States she has not needed for sleep for the past 3 days.  Was going to pursue a sleep study with her previous PCP but this did not occur.    She does demonstrate significant depression on screening today.  For this she is on 200 mg of Wellbutrin twice daily and 40 mg of fluoxetine.  She is always also on iron replacement.  Has a history of DVT in pregnancy and Antithrombin III deficiency.  For this she is on Eliquis chronically. Following closely with Dr. Marie.     Says insomnia has been more prominent for the past week. Waking up every 5-10 minutes all night long. Says prior to this it was once in a blue moon. Denies any recent changes to her medicines.     Parents say they noticed she pauses in her sleep with her breathing. Says she gained a lot of weight in the last year.     Feels tired all the time, needs to nap in the daytime. Tired while driving. Feels this adds to a feeling of depression - she has no energy or motivation    PHQ9/GAD7:  Over the past 2 weeks, how often have you been bothered by any of the following problems?  Trouble falling or staying asleep, or sleeping too much: Nearly every day  Feeling tired or having little energy: Several days  Poor appetite or overeating: Nearly every day  Feeling bad about yourself - or that you are a failure or have let yourself or your family down: Nearly every day  Trouble concentrating on things, such as reading the newspaper or watching television: Nearly every day  Moving or speaking so slowly that other people could have noticed? Or the opposite - being so fidgety or restless that you have been moving around a lot more than usual.: Several days  Thoughts that you would be better off dead or hurting yourself in some way: Not at all  Patient Health  Questionnaire-9 Score: 17  Over the last 2 weeks, how often have you been bothered by any of the following problems?  Feeling nervous, anxious, or on edge: Several days  Not being able to stop or control worrying: More than half the days  Worrying too much about different things: Nearly every day  Trouble relaxing: Several days  Being so restless that it is hard to sit still: Several days  Becoming easily annoyed or irritable: Several days  Feeling afraid as if something awful might happen: Not at all  ADAMARIS-7 Total Score: 9      Past Medical History:   Diagnosis Date    Other pericardial effusion (noninflammatory) ([facility]) 2021    Pericardial effusion    Other specified health status     No pertinent past medical history    Personal history of diseases of the blood and blood-forming organs and certain disorders involving the immune mechanism     History of bleeding disorder        Current Medications  Current Outpatient Medications   Medication Instructions    apixaban (ELIQUIS) 2.5 mg, oral, 2 times daily    buPROPion SR (Wellbutrin SR) 200 mg 12 hr tablet 1 tablet, oral, 2 times daily    ferrous sulfate 325 (65 Fe) MG tablet 65 mg of iron, oral, Daily, for 30 day(s)    FLUoxetine (PROzac) 40 mg capsule 1 capsule, oral, Daily    MULTIVITAMIN ORAL 1 tablet, Daily, for 30 day(s)        Allergies  No Known Allergies     Past Surgical History:   Procedure Laterality Date     SECTION, CLASSIC  2021     Section    HYSTERECTOMY      OTHER SURGICAL HISTORY  2021    Knee surgery     Family History   Adopted: Yes   Problem Relation Name Age of Onset    No Known Problems Child      No Known Problems Other       Social History     Tobacco Use    Smoking status: Never    Smokeless tobacco: Never   Vaping Use    Vaping status: Never Used   Substance Use Topics    Alcohol use: Not Currently    Drug use: Never     Tobacco Use: Low Risk  (5/3/2024)    Patient History     Smoking Tobacco Use: Never      Smokeless Tobacco Use: Never     Passive Exposure: Not on file        ROS  All pertinent positive symptoms are included in the history of present illness.  All other systems have been reviewed and are negative and noncontributory to this patient's current ailments.    VITAL SIGNS  Vitals:    05/03/24 1109   BP: 130/84   Pulse: 106   Resp: 18   Temp: 36.4 °C (97.6 °F)   SpO2: 97%     Vitals:    05/03/24 1109   Weight: 121 kg (267 lb 12.8 oz)      Body mass index is 48.2 kg/m².     PHYSICAL EXAM  GENERAL APPEARANCE: well nourished, well developed, looks like stated age, in no acute distress, not ill or tired appearing, conversing well.   HEENT: no trauma, normocephalic.   NECK: no nodes, supple without rigidity, no neck mass was observed,  no thyromegaly.   HEART: regular rate and rhythm, S1 and S2 heard with no murmurs or skipped beats. No carotid bruits.  LUNGS: clear to auscultation bilaterally with no wheezes, crackles or rales. Slight conversational dyspnea on exam, good pulse ox though.    ABDOMEN: Obese  EXTREMITIES: moving all extremities equally with no edema or deformities.   SKIN: normal skin color and pigmentation, normal skin turgor without rash, lesions, or nodules visualized.   NEUROLOGIC EXAM: CN II-XII grossly intact, normal gait, normal balance.   PSYCH: mood and affect appropriate; alert and oriented to time, place, person; no difficulty with speech or language.       Assessment/Plan   Problem List Items Addressed This Visit    None  Visit Diagnoses         Codes    Encounter to establish care with new doctor    -  Primary Z76.89    Insomnia, unspecified type     G47.00    Relevant Orders    Referral to Pulmonology    Referral to Adult Sleep Medicine    Sleep apnea, unspecified type     G47.30    Relevant Orders    Referral to Pulmonology    Referral to Adult Sleep Medicine    Dyspnea, unspecified type     R06.00    Relevant Orders    Referral to Pulmonology    Referral to Adult Sleep Medicine     Chronic fatigue     R53.82    Relevant Orders    Tsh With Reflex To Free T4 If Abnormal    Vitamin D 25-Hydroxy,Total (for eval of Vitamin D levels)            Additional Visit Plans:  Some conversational dyspnea on exam at baseline, suspect sleep apnea but possibly underlying respiratory condition as well. Lets have you see pulmonology for evaluation    Plan to also check your thyroid and Vit D levels to see if this is adding to how you feel.     Plan for endocrinologist and cortisol testing if all of these are negative - you are possibly interested in those.     Plan to see sleep medicine for sleep testing.     Next Wellness Exam/Annual Physical Due  At your earliest convenience    Patient Care Team:  Jose Manuel Sawyer DO as PCP - General (Family Medicine)  Mary Chawla MD as PCP - [facility] Medicaid PCP  Mary Chawla MD as PCP - [facility] PCP  Mary Chawla MD (Family Medicine)  Bob Marie MD as Consulting Physician (Hematology and Oncology)    Jose Manuel Sawyer DO   05/03/24   12:30 PM

## 2024-05-03 NOTE — PATIENT INSTRUCTIONS
Problem List Items Addressed This Visit    None  Visit Diagnoses         Codes    Encounter to establish care with new doctor    -  Primary Z76.89    Insomnia, unspecified type     G47.00    Relevant Orders    Referral to Pulmonology    Referral to Adult Sleep Medicine    Sleep apnea, unspecified type     G47.30    Relevant Orders    Referral to Pulmonology    Referral to Adult Sleep Medicine    Dyspnea, unspecified type     R06.00    Relevant Orders    Referral to Pulmonology    Referral to Adult Sleep Medicine    Chronic fatigue     R53.82    Relevant Orders    Tsh With Reflex To Free T4 If Abnormal    Vitamin D 25-Hydroxy,Total (for eval of Vitamin D levels)            Additional Visit Plans:  Some conversational dyspnea on exam at baseline, suspect sleep apnea but possibly underlying respiratory condition as well    Plan to also check your thyroid and Vit D levels to see if this is adding to how you feel.     Plan for endocrinologist and cortisol testing if all of these are negative    Plan to see sleep medicine for testing.     Next Wellness Exam/Annual Physical Due  At your earliest convenience    Patient Care Team:  Jose Manuel Sawyer DO as PCP - General (Family Medicine)  Mary Chawla MD as PCP - [facility] Medicaid PCP  Mary Chawla MD as PCP - [facility] PCP  Mary Chawla MD (Family Medicine)  Bob Marie MD as Consulting Physician (Hematology and Oncology)    Jose Manuel Sawyer DO   05/03/24   11:41 AM

## 2024-05-04 LAB — 25(OH)D3 SERPL-MCNC: 21 NG/ML (ref 30–100)

## 2024-05-28 DIAGNOSIS — E55.9 VITAMIN D DEFICIENCY: Primary | ICD-10-CM

## 2024-05-28 RX ORDER — CHOLECALCIFEROL (VITAMIN D3) 1250 MCG
50000 TABLET ORAL
Qty: 12 TABLET | Refills: 0 | Status: SHIPPED | OUTPATIENT
Start: 2024-06-02 | End: 2024-08-31

## 2024-07-30 ENCOUNTER — APPOINTMENT (OUTPATIENT)
Dept: OTOLARYNGOLOGY | Facility: CLINIC | Age: 29
End: 2024-07-30
Payer: COMMERCIAL

## 2024-08-12 ENCOUNTER — APPOINTMENT (OUTPATIENT)
Dept: PRIMARY CARE | Facility: CLINIC | Age: 29
End: 2024-08-12
Payer: COMMERCIAL

## 2024-09-10 ENCOUNTER — HOSPITAL ENCOUNTER (OUTPATIENT)
Dept: RADIOLOGY | Facility: HOSPITAL | Age: 29
Discharge: HOME | End: 2024-09-10
Payer: COMMERCIAL

## 2024-09-10 DIAGNOSIS — R06.02 SHORTNESS OF BREATH: ICD-10-CM

## 2024-09-10 PROCEDURE — 71046 X-RAY EXAM CHEST 2 VIEWS: CPT

## 2024-09-10 PROCEDURE — 71046 X-RAY EXAM CHEST 2 VIEWS: CPT | Performed by: RADIOLOGY

## 2024-09-20 ENCOUNTER — TELEPHONE (OUTPATIENT)
Dept: HEMATOLOGY/ONCOLOGY | Facility: CLINIC | Age: 29
End: 2024-09-20
Payer: COMMERCIAL

## 2024-09-26 ENCOUNTER — LAB (OUTPATIENT)
Dept: LAB | Facility: CLINIC | Age: 29
End: 2024-09-26
Payer: COMMERCIAL

## 2024-09-26 ENCOUNTER — CLINICAL SUPPORT (OUTPATIENT)
Dept: SLEEP MEDICINE | Facility: HOSPITAL | Age: 29
End: 2024-09-26
Payer: COMMERCIAL

## 2024-09-26 VITALS
SYSTOLIC BLOOD PRESSURE: 160 MMHG | BODY MASS INDEX: 48.37 KG/M2 | OXYGEN SATURATION: 99 % | DIASTOLIC BLOOD PRESSURE: 114 MMHG | WEIGHT: 273 LBS | HEIGHT: 63 IN | RESPIRATION RATE: 16 BRPM

## 2024-09-26 DIAGNOSIS — G47.30 SLEEP APNEA, UNSPECIFIED: ICD-10-CM

## 2024-09-26 DIAGNOSIS — D68.59 ANTITHROMBIN III DEFICIENCY (MULTI): ICD-10-CM

## 2024-09-26 LAB
ALBUMIN SERPL BCP-MCNC: 4 G/DL (ref 3.4–5)
ALP SERPL-CCNC: 50 U/L (ref 33–110)
ALT SERPL W P-5'-P-CCNC: 40 U/L (ref 7–45)
ANION GAP SERPL CALC-SCNC: 13 MMOL/L (ref 10–20)
AST SERPL W P-5'-P-CCNC: 25 U/L (ref 9–39)
BASOPHILS # BLD AUTO: 0.05 X10*3/UL (ref 0–0.1)
BASOPHILS NFR BLD AUTO: 0.7 %
BILIRUB SERPL-MCNC: 0.6 MG/DL (ref 0–1.2)
BUN SERPL-MCNC: 13 MG/DL (ref 6–23)
CALCIUM SERPL-MCNC: 9.2 MG/DL (ref 8.6–10.3)
CHLORIDE SERPL-SCNC: 103 MMOL/L (ref 98–107)
CO2 SERPL-SCNC: 26 MMOL/L (ref 21–32)
CREAT SERPL-MCNC: 0.54 MG/DL (ref 0.5–1.05)
EGFRCR SERPLBLD CKD-EPI 2021: >90 ML/MIN/1.73M*2
EOSINOPHIL # BLD AUTO: 0.2 X10*3/UL (ref 0–0.7)
EOSINOPHIL NFR BLD AUTO: 3 %
ERYTHROCYTE [DISTWIDTH] IN BLOOD BY AUTOMATED COUNT: 13.9 % (ref 11.5–14.5)
GLUCOSE SERPL-MCNC: 90 MG/DL (ref 74–99)
HCT VFR BLD AUTO: 43.5 % (ref 36–46)
HGB BLD-MCNC: 13.6 G/DL (ref 12–16)
IMM GRANULOCYTES # BLD AUTO: 0.07 X10*3/UL (ref 0–0.7)
IMM GRANULOCYTES NFR BLD AUTO: 1 % (ref 0–0.9)
LYMPHOCYTES # BLD AUTO: 2.28 X10*3/UL (ref 1.2–4.8)
LYMPHOCYTES NFR BLD AUTO: 34.1 %
MCH RBC QN AUTO: 26.5 PG (ref 26–34)
MCHC RBC AUTO-ENTMCNC: 31.3 G/DL (ref 32–36)
MCV RBC AUTO: 85 FL (ref 80–100)
MONOCYTES # BLD AUTO: 0.39 X10*3/UL (ref 0.1–1)
MONOCYTES NFR BLD AUTO: 5.8 %
NEUTROPHILS # BLD AUTO: 3.7 X10*3/UL (ref 1.2–7.7)
NEUTROPHILS NFR BLD AUTO: 55.4 %
NRBC BLD-RTO: 0 /100 WBCS (ref 0–0)
PLATELET # BLD AUTO: 264 X10*3/UL (ref 150–450)
POTASSIUM SERPL-SCNC: 4.3 MMOL/L (ref 3.5–5.3)
PROT SERPL-MCNC: 7.1 G/DL (ref 6.4–8.2)
RBC # BLD AUTO: 5.13 X10*6/UL (ref 4–5.2)
SODIUM SERPL-SCNC: 138 MMOL/L (ref 136–145)
WBC # BLD AUTO: 6.7 X10*3/UL (ref 4.4–11.3)

## 2024-09-26 PROCEDURE — 80053 COMPREHEN METABOLIC PANEL: CPT

## 2024-09-26 PROCEDURE — 82728 ASSAY OF FERRITIN: CPT

## 2024-09-26 PROCEDURE — 82607 VITAMIN B-12: CPT

## 2024-09-26 PROCEDURE — 82746 ASSAY OF FOLIC ACID SERUM: CPT

## 2024-09-26 PROCEDURE — 85025 COMPLETE CBC W/AUTO DIFF WBC: CPT

## 2024-09-26 PROCEDURE — 36415 COLL VENOUS BLD VENIPUNCTURE: CPT

## 2024-09-26 PROCEDURE — 83540 ASSAY OF IRON: CPT

## 2024-09-26 ASSESSMENT — SLEEP AND FATIGUE QUESTIONNAIRES
SITING INACTIVE IN A PUBLIC PLACE LIKE A CLASS ROOM OR A MOVIE THEATER: HIGH CHANCE OF DOZING
HOW LIKELY ARE YOU TO NOD OFF OR FALL ASLEEP WHILE LYING DOWN TO REST IN THE AFTERNOON WHEN CIRCUMSTANCES PERMIT: HIGH CHANCE OF DOZING
ESS-CHAD TOTAL SCORE: 19
HOW LIKELY ARE YOU TO NOD OFF OR FALL ASLEEP WHILE SITTING AND READING: HIGH CHANCE OF DOZING
HOW LIKELY ARE YOU TO NOD OFF OR FALL ASLEEP WHILE WATCHING TV: HIGH CHANCE OF DOZING
HOW LIKELY ARE YOU TO NOD OFF OR FALL ASLEEP IN A CAR, WHILE STOPPED FOR A FEW MINUTES IN TRAFFIC: SLIGHT CHANCE OF DOZING
HOW LIKELY ARE YOU TO NOD OFF OR FALL ASLEEP WHEN YOU ARE A PASSENGER IN A CAR FOR AN HOUR WITHOUT A BREAK: HIGH CHANCE OF DOZING
HOW LIKELY ARE YOU TO NOD OFF OR FALL ASLEEP WHILE SITTING AND TALKING TO SOMEONE: SLIGHT CHANCE OF DOZING
HOW LIKELY ARE YOU TO NOD OFF OR FALL ASLEEP WHILE SITTING QUIETLY AFTER LUNCH WITHOUT ALCOHOL: MODERATE CHANCE OF DOZING

## 2024-09-27 ENCOUNTER — OFFICE VISIT (OUTPATIENT)
Dept: HEMATOLOGY/ONCOLOGY | Facility: CLINIC | Age: 29
End: 2024-09-27
Payer: COMMERCIAL

## 2024-09-27 VITALS
DIASTOLIC BLOOD PRESSURE: 82 MMHG | RESPIRATION RATE: 18 BRPM | OXYGEN SATURATION: 96 % | BODY MASS INDEX: 47.84 KG/M2 | HEART RATE: 104 BPM | SYSTOLIC BLOOD PRESSURE: 137 MMHG | WEIGHT: 270.06 LBS | TEMPERATURE: 96.9 F

## 2024-09-27 DIAGNOSIS — D68.59 ANTITHROMBIN III DEFICIENCY (MULTI): Primary | ICD-10-CM

## 2024-09-27 LAB
FERRITIN SERPL-MCNC: 53 NG/ML (ref 8–150)
FOLATE SERPL-MCNC: 10.8 NG/ML
IRON SATN MFR SERPL: 13 % (ref 25–45)
IRON SERPL-MCNC: 47 UG/DL (ref 35–150)
TIBC SERPL-MCNC: 371 UG/DL (ref 240–445)
UIBC SERPL-MCNC: 324 UG/DL (ref 110–370)
VIT B12 SERPL-MCNC: 356 PG/ML (ref 211–911)

## 2024-09-27 PROCEDURE — 99214 OFFICE O/P EST MOD 30 MIN: CPT | Performed by: INTERNAL MEDICINE

## 2024-09-27 PROCEDURE — 3079F DIAST BP 80-89 MM HG: CPT | Performed by: INTERNAL MEDICINE

## 2024-09-27 PROCEDURE — 3075F SYST BP GE 130 - 139MM HG: CPT | Performed by: INTERNAL MEDICINE

## 2024-09-27 ASSESSMENT — PAIN SCALES - GENERAL: PAINLEVEL: 0-NO PAIN

## 2024-09-27 NOTE — PROGRESS NOTES
Patient ID: Gabrielle Rosales is a 28 y.o. female.  Referring Physician: Bob Marie MD  [address]  [address] [place] [address]  Primary Care Provider: Jose Manuel Sawyer DO  Referral Reason:     Subjective:    No complaints      Heme/Onc History:  28-year-old woman who presented to the emergency room on September 3, 2021, with complaints of left lower extremity pain and was diagnosed with a left lower extremity DVT. She was initiated on Lovenox 40 mg b.I.d. The patient saw her primary care provider  on 2021, for refill on Lovenox. Dose was increased to 100 mg twice daily and the patient was referred to our office.  Subsequent dose 120 mg twice daily.  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 She was on Xarelto for time following surgery.  She self-discontinued this and was never seen in follow-up.     Gabrielle Rosales has a history of ATIII deficiency and left leg DVT during pregnancy, diagnosed in 2021, for which she is on Lovenox 120 mg subcu twice daily.  In  she developed intense chest and back pains that would like labor  pains.  She had hypertension and proteinuria and was diagnosed with HELLP syndrome.  She was delivered early at 33 weeks on 2022 by .  Her postoperative course was complicated by by a wound infection and she currently has no wound  and is on the wound VAC.  However she denies fever or chills.  She continues on Lovenox tomogram subcu twice daily, which she is tolerating without bleeding.  She denies left leg swelling redness or pain.  On 2022 duplex ultrasound revealed  thrombosis present in the left popliteal vein, and a slightly different location from previous imaging.           Past Medical History:   Past Medical History:   Diagnosis Date    Other  pericardial effusion (noninflammatory) ([facility]) 2021    Pericardial effusion    Other specified health status     No pertinent past medical history    Personal history of diseases of the blood and blood-forming organs and certain disorders involving the immune mechanism     History of bleeding disorder     Social History:   Social History     Socioeconomic History    Marital status: Single     Spouse name: Not on file    Number of children: Not on file    Years of education: Not on file    Highest education level: Not on file   Occupational History    Not on file   Tobacco Use    Smoking status: Never    Smokeless tobacco: Never   Vaping Use    Vaping status: Never Used   Substance and Sexual Activity    Alcohol use: Not Currently    Drug use: Never    Sexual activity: Not Currently   Other Topics Concern    Not on file   Social History Narrative    Not on file     Social Determinants of Health     Financial Resource Strain: Not on File (2021)    Received from [facility]    Financial Resource Strain     Financial Resource Strain: 0   Food Insecurity: Not on file (2024)   Transportation Needs: Not on File (2021)    Received from [facility]    Transportation Needs     Transportation: 0   Physical Activity: Not on File (2021)    Received from [facility]    Physical Activity     Physical Activity: 0   Stress: Not on File (2021)    Received from [facility]    Stress     Stress: 0   Social Connections: Not on File (2021)    Received from [facility]    Social Connections     Social Connections and Isolation: 0   Intimate Partner Violence: Not on file   Housing Stability: Not on File (2021)    Received from [facility]    Housing Stability     Housin     Surgical History:   Past Surgical History:   Procedure Laterality Date     SECTION, CLASSIC  2021     Section    HYSTERECTOMY      OTHER SURGICAL HISTORY  2021    Knee surgery     Family  History:   Family History   Adopted: Yes   Problem Relation Name Age of Onset    No Known Problems Child      No Known Problems Other        reports that she has never smoked. She has never used smokeless tobacco.  Oncology Family history: Cancer-related family history is not on file. She was adopted.    Review Of Systems:  As stated per in HPI; otherwise all other 12 point ROS are negative    Physical Exam:  /82 (BP Location: Left arm, Patient Position: Sitting, BP Cuff Size: Large adult)   Pulse 104   Temp 36.1 °C (96.9 °F) (Temporal)   Resp 18   Wt 123 kg (270 lb 1 oz)   LMP 08/13/2023   SpO2 96%   BMI 47.84 kg/m²   BSA: 2.34 meters squared  General: awake/alert/oriented x3, no distress, alert and cooperative  Head: Short hair fully covering scalp. Symmetric facial expressions  Eyes: PERRL, EOMI, clear sclera, eyebrows present.  Ears/Nose/Mouth/Throat:  Oral mucous membranes moist. No oral ulcers. No palpable pre/post-auricular lymph nodes  Neck: No palpable cervical chain lymph nodes  Respiratory: unlabored breathing on room air, good chest expansion, thorax symmetric  Cardio: Regular rate and rhythm, normal S1 and S2, radial pulses symmetric  GI: Nondistended, soft, non-tender abdomen  Musculoskeletal: Normal muscle bulk and tone, ROM intact, no joint swelling.  Rises from chair and walks unassisted.  Extremities: No ankle swelling, no arm or leg wounds  Neuro: Alert, cognition intact, speech normal. Facial expressions symmetric.  No motor deficits noted. Sensation intact to touch and hot/cold.   Able to stand from seated position unassisted and walks around the room unassisted.  Psychological: Appropriate mood and behavior.  Skin: Warm and dry, no lesions, no rashes    Results:  Diagnostic Results   Lab Results   Component Value Date    WBC 6.7 09/26/2024    HGB 13.6 09/26/2024    HCT 43.5 09/26/2024    MCV 85 09/26/2024     09/26/2024     Lab Results   Component Value Date    CALCIUM 9.2  09/26/2024     09/26/2024    K 4.3 09/26/2024    CO2 26 09/26/2024     09/26/2024    BUN 13 09/26/2024    CREATININE 0.54 09/26/2024    ALT 40 09/26/2024    AST 25 09/26/2024       Current Outpatient Medications:     apixaban (Eliquis) 5 mg tablet, Take 0.5 tablets (2.5 mg) by mouth 2 times a day., Disp: 90 tablet, Rfl: 3    buPROPion SR (Wellbutrin SR) 200 mg 12 hr tablet, Take 1 tablet (200 mg) by mouth 2 times a day., Disp: , Rfl:     buPROPion XL (Wellbutrin XL) 300 mg 24 hr tablet, TAKE ONE TABLET BY MOUTH IN THE MORNING, Disp: 90 tablet, Rfl: 1    ferrous sulfate 325 (65 Fe) MG tablet, Take 1 tablet by mouth once daily. for 30 day(s), Disp: , Rfl:     FLUoxetine (PROzac) 40 mg capsule, TAKE ONE CAPSULE BY MOUTH DAILY, Disp: 90 capsule, Rfl: 1    MULTIVITAMIN ORAL, 1 tablet once daily. for 30 day(s), Disp: , Rfl:      Radiology:    Pathology:    Assessment/Plan:  ? SWATI:    SWATI is resolved since 09/2023 since she had partial hysterectomy due to menorrhagia secondary to Eliquis. She still has low iron saturation, so I advised her to keep taking oral iron every other day for another 6 months and have her labs checked by her PCP.    2- ATIII deficiency: with history of left leg DVT during pregnancy and history of PE. Lifelong AC with Eliquis 2.5 mg po BID is recommended    No need for further Hematology follow up. She will continue to follow with her PCP.    Diagnoses and all orders for this visit:  Antithrombin III deficiency (Multi)  -     Iron and TIBC; Future  -     Vitamin B12; Future  -     Ferritin; Future  -     Comprehensive Metabolic Panel; Future  -     Folate; Future  -     CBC and Auto Differential; Future  -     Clinic Appointment Request Follow Up (Est Pt New to MD-benign heme-30 min per MD); ZAIRE ESCAMILLA       Performance Status: Asymptomatic    I spent more than 30 minutes for the patient today, including face-to-face conversation, pre-visit preparation, post-visit orders, and  others.   Chayo Arriaga MD

## 2024-09-27 NOTE — PROGRESS NOTES
Pt seen in office today for an established patient/ new to provider follow up visit with Dr. Chayo Arriaga for management of her LLE DVT/ SWATI. She was previously seen in our office on 3/27/24 by Dr. Bob Marie. She is  without complaints today and denies pain.     Medications, pharmacy preference and allergies were reviewed with patient and updated in the medical record by MD.     Per orders, labs were obtained on 9/26/24 and results are to be reviewed in office today by MD with patient. Per Dr. Domingo she no longer needs to be seen ion our office, however she has been encouraged to contact us with any needs or concerns in the future.    Our contact information was given to patient and they were encouraged to contact us with any questions or concerns.     Patient verbalized understanding and agreement regarding discussed information via verbal feedback. Pt escorted to scheduling.

## 2024-09-27 NOTE — PROGRESS NOTES
[facility] TECH NOTE:     Patient: Gabrielle Rosales   MRN//AGE: [id number]  1995  28 y.o.   Technologist: Allison Perez   Room: 1   Service Date: 2024        Sleep Testing Location: [facility]: 19    TECHNOLOGIST SLEEP STUDY PROCEDURE NOTE:   This sleep study is being conducted according to the policies and procedures outlined by the [employer] accreditation standards. The sleep study procedure and processes involved during this appointment was explained to the patient/patient’s family, questions were answered. The patient/family verbalized understanding.      The patient is a 28 y.o. year old female scheduled for a Diagnostic PSG Split night . She arrived for her appointment.      The study that was ultimately completed was a Diagnostic PSG Split night .    The full study was completed.  Patient questionnaires completed?: yes     Consents signed? yes    Initial Fall Risk Screening:     Gabrielle has not fallen in the last 6 months. Gabrielle does not have a fear of falling. She does not need assistance with sitting, standing, or walking. She does not need assistance walking in her home. She does not need assistance in an unfamiliar setting. The patient is not using an assistive device.     Brief Study observations: Patient is a 28 year old female who weighs 124 kg and is 160 cm tall. Patient did meet the criteria for a split study. Patient was seen in right and supine position.        If PAP, which was preferred mask/pressure/mode: Respironics DreamWisp Nasal Small      After the procedure, the patient/family was informed to ensure followup with ordering clinician for testing results.      Technologist: NAHUM Mora

## 2024-10-11 ENCOUNTER — APPOINTMENT (OUTPATIENT)
Dept: PRIMARY CARE | Facility: CLINIC | Age: 29
End: 2024-10-11
Payer: COMMERCIAL

## 2024-10-11 NOTE — PROGRESS NOTES
STAFF TO DO ON ROOMING: Please do PHQ-9 and ADAMARIS-7 screenings           Outpatient Visit Note    No chief complaint on file.      HPI:  Gabrielle Rosales is a 28 y.o. female here to discuss her medications    When I saw her in May we discussed her significant depression with insomnia.  With her conversational dyspnea on exam I suspected sleep apnea but possibly an underlying respiratory condition as well.  She was trying to schedule things for a sleep study and I recommended she also see a pulmonologist for further evaluation.  If all workup was negative she was going to look into seeing an endocrinologist for cortisol testing.  At that time she was on Wellbutrin Prozac.    Today she states that XX     PHQ9/GAD7:         Patient Active Problem List    Diagnosis Date Noted    Otalgia 09/07/2023    Abnormal fetal echocardiogram affecting antepartum care of mother ([facility]) 09/07/2023    Amenorrhea 09/07/2023    Anxiety in pregnancy, antepartum ([facility]) 09/07/2023    Depression affecting pregnancy in second trimester, antepartum ([facility]) 09/07/2023    Depression with anxiety 09/07/2023    Deep venous thrombosis (Multi) 09/07/2023    DVT complicating pregnancy ([facility]) 09/07/2023    Elevated blood pressure affecting pregnancy, antepartum ([facility]) 09/07/2023    Exudative pharyngitis 09/07/2023    Insulin controlled gestational diabetes mellitus (GDM) during pregnancy, antepartum ([facility]) 09/07/2023    History of gestational diabetes 09/07/2023    Leukopenia 09/07/2023    Excessive and frequent menstruation with regular cycle 09/07/2023    Menometrorrhagia 09/07/2023    Mild postpartum depression 09/07/2023    Morbid (severe) obesity due to excess calories (Multi) 09/07/2023    Osteochondral defect of femoral condyle 09/07/2023    Antithrombin III deficiency (Multi) 09/07/2023    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    Patellar dislocation, left, subsequent encounter 09/07/2023        Past Medical History:    Diagnosis Date    Other pericardial effusion (noninflammatory) ([facility]) 2021    Pericardial effusion    Other specified health status     No pertinent past medical history    Personal history of diseases of the blood and blood-forming organs and certain disorders involving the immune mechanism     History of bleeding disorder        Current Medications  Current Outpatient Medications   Medication Instructions    apixaban (ELIQUIS) 2.5 mg, oral, 2 times daily    buPROPion SR (Wellbutrin SR) 200 mg 12 hr tablet 1 tablet, oral, 2 times daily    buPROPion XL (WELLBUTRIN XL) 300 mg, oral, Daily before breakfast    ferrous sulfate 325 (65 Fe) MG tablet 65 mg of iron, oral, Daily, for 30 day(s)    FLUoxetine (PROZAC) 40 mg, oral, Daily    MULTIVITAMIN ORAL 1 tablet, Daily, for 30 day(s)        Allergies  No Known Allergies     Past Surgical History:   Procedure Laterality Date     SECTION, CLASSIC  2021     Section    HYSTERECTOMY      OTHER SURGICAL HISTORY  2021    Knee surgery     Family History   Adopted: Yes   Problem Relation Name Age of Onset    No Known Problems Child      No Known Problems Other       Social History     Tobacco Use    Smoking status: Never    Smokeless tobacco: Never   Vaping Use    Vaping status: Never Used   Substance Use Topics    Alcohol use: Not Currently    Drug use: Never     Tobacco Use: Low Risk  (5/3/2024)    Patient History     Smoking Tobacco Use: Never     Smokeless Tobacco Use: Never     Passive Exposure: Not on file        ROS  All pertinent positive symptoms are included in the history of present illness.  All other systems have been reviewed and are negative and noncontributory to this patient's current ailments.    VITAL SIGNS  There were no vitals filed for this visit.  There were no vitals filed for this visit.   There is no height or weight on file to calculate BMI.     PHYSICAL EXAM  GENERAL APPEARANCE: well nourished, well developed,  looks like stated age, in no acute distress, not ill or tired appearing, conversing well.   HEENT: no trauma, normocephalic.   NECK: no nodes, supple without rigidity, no neck mass was observed,  no thyromegaly.   HEART: regular rate and rhythm, S1 and S2 heard with no murmurs or skipped beats. No carotid bruits.  LUNGS: clear to auscultation bilaterally with no wheezes, crackles or rales.   ABDOMEN: no organomegaly, soft, nontender, nondistended, normal bowel sounds, no guarding/rebound/rigidity.   EXTREMITIES: moving all extremities equally with no edema or deformities.   SKIN: normal skin color and pigmentation, normal skin turgor without rash, lesions, or nodules visualized.   NEUROLOGIC EXAM: CN II-XII grossly intact, normal gait, normal balance.   PSYCH: mood and affect appropriate; alert and oriented to time, place, person; no difficulty with speech or language.     GENERAL APPEARANCE: well nourished, well developed, looks like stated age, in no acute distress, not ill or tired appearing, conversing well.   HEENT: no trauma, normocephalic.   NECK: supple without rigidity, no neck mass was observed.   LUNGS: good chest wall expansion. In no acute respiratory distress.   EXTREMITIES: moving all extremities equally with no edema.   SKIN: normal skin color and pigmentation, without rash.   NEUROLOGIC EXAM: CN II-XII grossly intact, normal gait.   PSYCH: mood and affect appropriate; alert and oriented to time, place, person; no difficulty with speech or language.     Counseling:       Medication education:           Education:  The patient is counseled regarding potential side-effects of all new medications          Understanding:  Patient expressed understanding of information conveyed today          Adherence:  No barriers to adherence identified     Assessment/Plan   {Assess/PlanSmartLinks:18834}    Additional Visit Plans:  ***    Next Wellness Exam/Annual Physical Due  ***    Patient Care Team:  Jose Manuel PONCE  DO Dinga as PCP - General (Family Medicine)  Mary Chawla MD as PCP - [facility] PCP  Mary Chawla MD as PCP - [facility] PCP  Mary Chawla MD (Family Medicine)  Bob Marie MD as Consulting Physician (Hematology and Oncology)  Chayo Arriaga MD as Medical Oncologist (Hematology and Oncology)    Jose Manuel Sawyer DO   10/11/24   10:49 AM

## 2025-02-25 DIAGNOSIS — F41.8 DEPRESSION WITH ANXIETY: ICD-10-CM

## 2025-02-25 NOTE — TELEPHONE ENCOUNTER
Med refill:  buPROPion XL (Wellbutrin XL) 300 mg 24 hr tablet TAKE ONE TABLET BY MOUTH IN THE MORNING     apixaban (Eliquis) 5 mg tablet Take 0.5 tablets (2.5 mg) by mouth 2 times a day.     FLUoxetine (PROzac) 40 mg capsule TAKE ONE CAPSULE BY MOUTH DAILY     Pharmacy: [facility] [place]

## 2025-02-28 RX ORDER — FLUOXETINE HYDROCHLORIDE 40 MG/1
40 CAPSULE ORAL DAILY
Qty: 90 CAPSULE | Refills: 0 | Status: SHIPPED | OUTPATIENT
Start: 2025-02-28

## 2025-02-28 RX ORDER — BUPROPION HYDROCHLORIDE 300 MG/1
300 TABLET ORAL
Qty: 90 TABLET | Refills: 0 | Status: SHIPPED | OUTPATIENT
Start: 2025-02-28

## 2025-06-16 ENCOUNTER — APPOINTMENT (OUTPATIENT)
Dept: PRIMARY CARE | Facility: CLINIC | Age: 30
End: 2025-06-16
Payer: COMMERCIAL

## 2025-06-16 VITALS
HEIGHT: 64 IN | TEMPERATURE: 97.6 F | BODY MASS INDEX: 48.49 KG/M2 | WEIGHT: 284 LBS | OXYGEN SATURATION: 95 % | DIASTOLIC BLOOD PRESSURE: 87 MMHG | SYSTOLIC BLOOD PRESSURE: 124 MMHG | HEART RATE: 100 BPM

## 2025-06-16 DIAGNOSIS — Z12.4 CERVICAL CANCER SCREENING: ICD-10-CM

## 2025-06-16 DIAGNOSIS — R41.840 IMPAIRED CONCENTRATION: ICD-10-CM

## 2025-06-16 DIAGNOSIS — G47.33 OBSTRUCTIVE SLEEP APNEA SYNDROME: ICD-10-CM

## 2025-06-16 DIAGNOSIS — D68.59 ANTITHROMBIN III DEFICIENCY (MULTI): Primary | ICD-10-CM

## 2025-06-16 DIAGNOSIS — I82.532 CHRONIC DEEP VEIN THROMBOSIS (DVT) OF POPLITEAL VEIN OF LEFT LOWER EXTREMITY: ICD-10-CM

## 2025-06-16 DIAGNOSIS — E66.01 MORBID (SEVERE) OBESITY DUE TO EXCESS CALORIES (MULTI): ICD-10-CM

## 2025-06-16 DIAGNOSIS — F41.8 DEPRESSION WITH ANXIETY: Chronic | ICD-10-CM

## 2025-06-16 DIAGNOSIS — R06.02 SOB (SHORTNESS OF BREATH): ICD-10-CM

## 2025-06-16 PROBLEM — R50.82 POSTOPERATIVE FEVER: Status: RESOLVED | Noted: 2025-06-16 | Resolved: 2025-06-16

## 2025-06-16 PROBLEM — O24.414 INSULIN CONTROLLED GESTATIONAL DIABETES MELLITUS (GDM) DURING PREGNANCY, ANTEPARTUM (HHS-HCC): Status: RESOLVED | Noted: 2023-09-07 | Resolved: 2025-06-16

## 2025-06-16 PROBLEM — O99.342 DEPRESSION AFFECTING PREGNANCY IN SECOND TRIMESTER, ANTEPARTUM (HHS-HCC): Status: RESOLVED | Noted: 2023-09-07 | Resolved: 2025-06-16

## 2025-06-16 PROBLEM — O99.340 ANXIETY IN PREGNANCY, ANTEPARTUM: Status: RESOLVED | Noted: 2023-09-07 | Resolved: 2025-06-16

## 2025-06-16 PROBLEM — M25.569 KNEE PAIN: Status: RESOLVED | Noted: 2025-06-16 | Resolved: 2025-06-16

## 2025-06-16 PROBLEM — I31.39 PERICARDIAL EFFUSION (HHS-HCC): Status: RESOLVED | Noted: 2025-06-16 | Resolved: 2025-06-16

## 2025-06-16 PROBLEM — F33.1 MODERATE EPISODE OF RECURRENT MAJOR DEPRESSIVE DISORDER: Chronic | Status: ACTIVE | Noted: 2024-04-11

## 2025-06-16 PROBLEM — N71.9 ENDOMETRITIS: Status: ACTIVE | Noted: 2025-06-16

## 2025-06-16 PROBLEM — H66.019 ACUTE SUPPURATIVE OTITIS MEDIA WITH SPONTANEOUS RUPTURE OF EAR DRUM: Status: RESOLVED | Noted: 2025-06-16 | Resolved: 2025-06-16

## 2025-06-16 PROBLEM — S93.409A SPRAIN OF ANKLE: Status: RESOLVED | Noted: 2025-06-16 | Resolved: 2025-06-16

## 2025-06-16 PROBLEM — B37.2 CANDIDIASIS OF SKIN: Status: RESOLVED | Noted: 2025-06-16 | Resolved: 2025-06-16

## 2025-06-16 PROBLEM — J02.9 EXUDATIVE PHARYNGITIS: Status: RESOLVED | Noted: 2023-09-07 | Resolved: 2025-06-16

## 2025-06-16 PROBLEM — O35.BXX0 ABNORMAL FETAL ECHOCARDIOGRAM AFFECTING ANTEPARTUM CARE OF MOTHER (HHS-HCC): Status: RESOLVED | Noted: 2023-09-07 | Resolved: 2025-06-16

## 2025-06-16 PROBLEM — J35.1 HYPERTROPHY OF TONSILS: Status: RESOLVED | Noted: 2025-06-16 | Resolved: 2025-06-16

## 2025-06-16 PROBLEM — R40.0 DAYTIME SOMNOLENCE: Status: ACTIVE | Noted: 2025-06-16

## 2025-06-16 PROBLEM — H92.09 OTALGIA: Status: RESOLVED | Noted: 2023-09-07 | Resolved: 2025-06-16

## 2025-06-16 PROBLEM — R03.0 FINDING OF ABOVE NORMAL BLOOD PRESSURE: Status: RESOLVED | Noted: 2025-06-16 | Resolved: 2025-06-16

## 2025-06-16 PROBLEM — Z86.2 HISTORY OF BLOOD DISORDER: Status: ACTIVE | Noted: 2025-06-16

## 2025-06-16 PROBLEM — F32.A DEPRESSION AFFECTING PREGNANCY IN SECOND TRIMESTER, ANTEPARTUM (HHS-HCC): Status: RESOLVED | Noted: 2023-09-07 | Resolved: 2025-06-16

## 2025-06-16 PROBLEM — D72.819 LEUKOPENIA: Status: RESOLVED | Noted: 2023-09-07 | Resolved: 2025-06-16

## 2025-06-16 PROBLEM — O16.9 ELEVATED BLOOD PRESSURE AFFECTING PREGNANCY, ANTEPARTUM: Status: RESOLVED | Noted: 2023-09-07 | Resolved: 2025-06-16

## 2025-06-16 PROBLEM — R06.83 SNORING: Status: RESOLVED | Noted: 2025-06-16 | Resolved: 2025-06-16

## 2025-06-16 PROBLEM — I31.39 PERICARDIAL EFFUSION (HHS-HCC): Status: ACTIVE | Noted: 2025-06-16

## 2025-06-16 PROBLEM — F33.41 RECURRENT MAJOR DEPRESSIVE DISORDER, IN PARTIAL REMISSION: Chronic | Status: ACTIVE | Noted: 2021-06-08

## 2025-06-16 PROBLEM — F41.9 ANXIETY IN PREGNANCY, ANTEPARTUM: Status: RESOLVED | Noted: 2023-09-07 | Resolved: 2025-06-16

## 2025-06-16 PROBLEM — Z86.2 HISTORY OF BLOOD DISORDER: Status: RESOLVED | Noted: 2025-06-16 | Resolved: 2025-06-16

## 2025-06-16 PROCEDURE — 99204 OFFICE O/P NEW MOD 45 MIN: CPT | Performed by: FAMILY MEDICINE

## 2025-06-16 PROCEDURE — 3008F BODY MASS INDEX DOCD: CPT | Performed by: FAMILY MEDICINE

## 2025-06-16 PROCEDURE — 1036F TOBACCO NON-USER: CPT | Performed by: FAMILY MEDICINE

## 2025-06-16 PROCEDURE — G8433 SCR FOR DEP NOT CPT DOC RSN: HCPCS | Performed by: FAMILY MEDICINE

## 2025-06-16 ASSESSMENT — ANXIETY QUESTIONNAIRES
1. FEELING NERVOUS, ANXIOUS, OR ON EDGE: NEARLY EVERY DAY
7. FEELING AFRAID AS IF SOMETHING AWFUL MIGHT HAPPEN: NEARLY EVERY DAY
3. WORRYING TOO MUCH ABOUT DIFFERENT THINGS: NEARLY EVERY DAY
GAD7 TOTAL SCORE: 21
6. BECOMING EASILY ANNOYED OR IRRITABLE: NEARLY EVERY DAY
4. TROUBLE RELAXING: NEARLY EVERY DAY
5. BEING SO RESTLESS THAT IT IS HARD TO SIT STILL: NEARLY EVERY DAY
2. NOT BEING ABLE TO STOP OR CONTROL WORRYING: NEARLY EVERY DAY
IF YOU CHECKED OFF ANY PROBLEMS ON THIS QUESTIONNAIRE, HOW DIFFICULT HAVE THESE PROBLEMS MADE IT FOR YOU TO DO YOUR WORK, TAKE CARE OF THINGS AT HOME, OR GET ALONG WITH OTHER PEOPLE: SOMEWHAT DIFFICULT

## 2025-06-16 ASSESSMENT — PATIENT HEALTH QUESTIONNAIRE - PHQ9
SUM OF ALL RESPONSES TO PHQ9 QUESTIONS 1 AND 2: 6
4. FEELING TIRED OR HAVING LITTLE ENERGY: NEARLY EVERY DAY
2. FEELING DOWN, DEPRESSED OR HOPELESS: NEARLY EVERY DAY
1. LITTLE INTEREST OR PLEASURE IN DOING THINGS: NEARLY EVERY DAY
SUM OF ALL RESPONSES TO PHQ QUESTIONS 1-9: 26
7. TROUBLE CONCENTRATING ON THINGS, SUCH AS READING THE NEWSPAPER OR WATCHING TELEVISION: NEARLY EVERY DAY
8. MOVING OR SPEAKING SO SLOWLY THAT OTHER PEOPLE COULD HAVE NOTICED. OR THE OPPOSITE, BEING SO FIGETY OR RESTLESS THAT YOU HAVE BEEN MOVING AROUND A LOT MORE THAN USUAL: MORE THAN HALF THE DAYS
3. TROUBLE FALLING OR STAYING ASLEEP OR SLEEPING TOO MUCH: NEARLY EVERY DAY
6. FEELING BAD ABOUT YOURSELF - OR THAT YOU ARE A FAILURE OR HAVE LET YOURSELF OR YOUR FAMILY DOWN: NEARLY EVERY DAY
5. POOR APPETITE OR OVEREATING: NEARLY EVERY DAY
9. THOUGHTS THAT YOU WOULD BE BETTER OFF DEAD, OR OF HURTING YOURSELF: NEARLY EVERY DAY

## 2025-06-16 ASSESSMENT — COLUMBIA-SUICIDE SEVERITY RATING SCALE - C-SSRS
1. IN THE PAST MONTH, HAVE YOU WISHED YOU WERE DEAD OR WISHED YOU COULD GO TO SLEEP AND NOT WAKE UP?: NO
2. HAVE YOU ACTUALLY HAD ANY THOUGHTS OF KILLING YOURSELF?: NO
6. HAVE YOU EVER DONE ANYTHING, STARTED TO DO ANYTHING, OR PREPARED TO DO ANYTHING TO END YOUR LIFE?: NO

## 2025-06-16 ASSESSMENT — ENCOUNTER SYMPTOMS
SHORTNESS OF BREATH: 1
DYSPHORIC MOOD: 1
SLEEP DISTURBANCE: 1
NERVOUS/ANXIOUS: 1
CARDIOVASCULAR NEGATIVE: 1

## 2025-06-16 NOTE — PROGRESS NOTES
"Subjective   Patient ID: Gabrielle Rosales is a 29 y.o. female who presents for New Patient Visit (Establish care NP - pt wants nutritions due to gaining weight,sleep issues, breathing issues along with depression/anxiety pt has done a sleep study and passed /Anxiety/depression medications has not been helping  /Pt has trouble focusing ).    The patient is new to the practice and is transferring her care from Jose Manuel Sawyer DO.  She is here to establish care.    1) Antithrombin III deficiency/DVT: stable/stable, compliant with Eliquis as directed, under the care of hem/onc (Chayo Arriaga MD), seen every 6 months but thinks she does not have to see them anymore, patient encouraged to follow up this year.  2) Depression with anxiety/Trouble focusing: uncontrolled/uncontrolled/uncontrolled, stopped taking Wellbutrin and Prozac one month ago due to ineffectiveness, under the care of a counselor who she just started seeing 3 visits ago, seen every week.    3) HOWARD/SOB: uncontrolled/uncontrolled, uses CPAP machine 3 times a week, has an appointment to follow up with pulmonology (Dr. Lemos).    4) Morbid obesity: uncontrolled, has noticed increased weight gain recently without any change in diet or exercise, would like TSH checked, would like to see a nutritionist.    Review of Systems   Respiratory:  Positive for shortness of breath.    Cardiovascular: Negative.    Psychiatric/Behavioral:  Positive for dysphoric mood and sleep disturbance. The patient is nervous/anxious.         Positive for difficulty with concentration.     Objective   /87 (BP Location: Left arm, Patient Position: Sitting, BP Cuff Size: Adult)   Pulse 100   Temp 36.4 °C (97.6 °F) (Temporal)   Ht 1.613 m (5' 3.5\")   Wt 129 kg (284 lb)   LMP 08/13/2023   SpO2 95%   BMI 49.52 kg/m²     Physical Exam  Vitals and nursing note reviewed.   Constitutional:       General: She is not in acute distress.     Appearance: Normal appearance. She is " obese. She is not ill-appearing.   Cardiovascular:      Rate and Rhythm: Normal rate and regular rhythm.      Heart sounds: Normal heart sounds.   Pulmonary:      Effort: Pulmonary effort is normal.      Breath sounds: Normal breath sounds. No stridor.      Comments: Audible breathing.  Neurological:      Mental Status: She is alert.     Assessment/Plan   Diagnoses and all orders for this visit:  Antithrombin III deficiency (Multi)/Chronic deep vein thrombosis (DVT) of popliteal vein of left lower extremity  Stable/Stable  Continue with Eliquis as directed.  Keep follow-up appointments with hem/onc.  Await input.  Follow up in 3 months for CPE.  Depression with anxiety/Impaired concentration  Uncontrolled/Uncontrolled/Uncontrolled  Referral to Psychiatry  Follow up in 3 months for CPE.  Obstructive sleep apnea syndrome/SOB (shortness of breath)  Uncontrolled/Uncontrolled  Use CPAP nightly.  Keep follow-up appointment with pulm.  Await input.  Follow up in 3 months for CPE.    Morbid (severe) obesity due to excess calories (Multi)  Uncontrolled  TSH with reflex to Free T4 if abnormal ordered.  Will notify with result.  Referral to Nutrition Services  Await input.  Follow up in 3 months for CPE.    Cervical cancer screening  Referral to Gynecology  Await input.  Follow up as directed.  Other orders  -     Follow Up In Primary Care - Health Maintenance; Future

## 2025-06-19 ENCOUNTER — TELEPHONE (OUTPATIENT)
Dept: HEMATOLOGY/ONCOLOGY | Facility: CLINIC | Age: 30
End: 2025-06-19
Payer: COMMERCIAL

## 2025-06-19 DIAGNOSIS — D68.59 ANTITHROMBIN III DEFICIENCY (MULTI): ICD-10-CM

## 2025-06-19 NOTE — TELEPHONE ENCOUNTER
Reason for Conversation  needs refill     Background   Patient called and states the she needs a refill on her Eliquis. In the last office visit  with Dr. Domingo 09/27/2025 she was told no need to follow up with Hematology but PCP Dr. Cheng stated that patient should be seeing Hematology which she was seen on 06/16 ( see note) Can the medication be filled and does she need a follow up. Please advise.     Disposition   No disposition on file.

## 2025-06-26 ENCOUNTER — HOSPITAL ENCOUNTER (EMERGENCY)
Facility: HOSPITAL | Age: 30
Discharge: HOME | End: 2025-06-26
Payer: COMMERCIAL

## 2025-06-26 VITALS
RESPIRATION RATE: 16 BRPM | DIASTOLIC BLOOD PRESSURE: 105 MMHG | HEIGHT: 63 IN | OXYGEN SATURATION: 98 % | HEART RATE: 99 BPM | SYSTOLIC BLOOD PRESSURE: 149 MMHG | BODY MASS INDEX: 50.5 KG/M2 | TEMPERATURE: 98.6 F | WEIGHT: 285 LBS

## 2025-06-26 DIAGNOSIS — M25.50 PAIN IN JOINT, MULTIPLE SITES: Primary | ICD-10-CM

## 2025-06-26 LAB
ALBUMIN SERPL BCP-MCNC: 4.3 G/DL (ref 3.4–5)
ALP SERPL-CCNC: 68 U/L (ref 33–110)
ALT SERPL W P-5'-P-CCNC: 80 U/L (ref 7–45)
ANION GAP SERPL CALCULATED.3IONS-SCNC: 14 MMOL/L (ref 10–20)
APTT PPP: 36.7 SECONDS (ref 22–32.5)
AST SERPL W P-5'-P-CCNC: 64 U/L (ref 9–39)
BASOPHILS # BLD AUTO: 0.03 X10*3/UL (ref 0–0.1)
BASOPHILS NFR BLD AUTO: 0.7 %
BILIRUB SERPL-MCNC: 0.6 MG/DL (ref 0–1.2)
BUN SERPL-MCNC: 12 MG/DL (ref 6–23)
CALCIUM SERPL-MCNC: 9.1 MG/DL (ref 8.6–10.3)
CHLORIDE SERPL-SCNC: 102 MMOL/L (ref 98–107)
CO2 SERPL-SCNC: 27 MMOL/L (ref 21–32)
CREAT SERPL-MCNC: 0.58 MG/DL (ref 0.5–1.05)
EGFRCR SERPLBLD CKD-EPI 2021: >90 ML/MIN/1.73M*2
EOSINOPHIL # BLD AUTO: 0.13 X10*3/UL (ref 0–0.7)
EOSINOPHIL NFR BLD AUTO: 3.1 %
ERYTHROCYTE [DISTWIDTH] IN BLOOD BY AUTOMATED COUNT: 13.6 % (ref 11.5–14.5)
GLUCOSE SERPL-MCNC: 126 MG/DL (ref 74–99)
HCT VFR BLD AUTO: 42.5 % (ref 36–46)
HGB BLD-MCNC: 13.9 G/DL (ref 12–16)
IMM GRANULOCYTES # BLD AUTO: 0.03 X10*3/UL (ref 0–0.7)
IMM GRANULOCYTES NFR BLD AUTO: 0.7 % (ref 0–0.9)
INR PPP: 1 (ref 0.9–1.2)
LYMPHOCYTES # BLD AUTO: 0.91 X10*3/UL (ref 1.2–4.8)
LYMPHOCYTES NFR BLD AUTO: 21.4 %
MCH RBC QN AUTO: 27.3 PG (ref 26–34)
MCHC RBC AUTO-ENTMCNC: 32.7 G/DL (ref 32–36)
MCV RBC AUTO: 84 FL (ref 80–100)
MONOCYTES # BLD AUTO: 0.35 X10*3/UL (ref 0.1–1)
MONOCYTES NFR BLD AUTO: 8.2 %
NEUTROPHILS # BLD AUTO: 2.8 X10*3/UL (ref 1.2–7.7)
NEUTROPHILS NFR BLD AUTO: 65.9 %
NRBC BLD-RTO: 0 /100 WBCS (ref 0–0)
PLATELET # BLD AUTO: 263 X10*3/UL (ref 150–450)
POTASSIUM SERPL-SCNC: 4.1 MMOL/L (ref 3.5–5.3)
PROT SERPL-MCNC: 7.3 G/DL (ref 6.4–8.2)
PROTHROMBIN TIME: 10.7 SECONDS (ref 9.3–12.7)
RBC # BLD AUTO: 5.09 X10*6/UL (ref 4–5.2)
SODIUM SERPL-SCNC: 139 MMOL/L (ref 136–145)
WBC # BLD AUTO: 4.3 X10*3/UL (ref 4.4–11.3)

## 2025-06-26 PROCEDURE — 85025 COMPLETE CBC W/AUTO DIFF WBC: CPT

## 2025-06-26 PROCEDURE — 2500000001 HC RX 250 WO HCPCS SELF ADMINISTERED DRUGS (ALT 637 FOR MEDICARE OP)

## 2025-06-26 PROCEDURE — 2500000004 HC RX 250 GENERAL PHARMACY W/ HCPCS (ALT 636 FOR OP/ED): Mod: JZ

## 2025-06-26 PROCEDURE — 99284 EMERGENCY DEPT VISIT MOD MDM: CPT | Mod: 25

## 2025-06-26 PROCEDURE — 80053 COMPREHEN METABOLIC PANEL: CPT

## 2025-06-26 PROCEDURE — 36415 COLL VENOUS BLD VENIPUNCTURE: CPT

## 2025-06-26 PROCEDURE — 96361 HYDRATE IV INFUSION ADD-ON: CPT

## 2025-06-26 PROCEDURE — 96374 THER/PROPH/DIAG INJ IV PUSH: CPT

## 2025-06-26 PROCEDURE — 85610 PROTHROMBIN TIME: CPT

## 2025-06-26 RX ORDER — ACETAMINOPHEN 325 MG/1
975 TABLET ORAL ONCE
Status: COMPLETED | OUTPATIENT
Start: 2025-06-26 | End: 2025-06-26

## 2025-06-26 RX ORDER — ORPHENADRINE CITRATE 30 MG/ML
60 INJECTION INTRAMUSCULAR; INTRAVENOUS ONCE
Status: COMPLETED | OUTPATIENT
Start: 2025-06-26 | End: 2025-06-26

## 2025-06-26 RX ORDER — PREDNISONE 50 MG/1
50 TABLET ORAL DAILY
Qty: 5 TABLET | Refills: 0 | Status: SHIPPED | OUTPATIENT
Start: 2025-06-26 | End: 2025-07-01

## 2025-06-26 RX ORDER — TRAMADOL HYDROCHLORIDE 50 MG/1
50 TABLET, FILM COATED ORAL EVERY 6 HOURS PRN
Qty: 12 TABLET | Refills: 0 | Status: SHIPPED | OUTPATIENT
Start: 2025-06-26 | End: 2025-06-29

## 2025-06-26 RX ADMIN — SODIUM CHLORIDE 1000 ML: 900 INJECTION, SOLUTION INTRAVENOUS at 18:15

## 2025-06-26 RX ADMIN — ORPHENADRINE CITRATE 60 MG: 30 INJECTION, SOLUTION INTRAMUSCULAR; INTRAVENOUS at 18:15

## 2025-06-26 RX ADMIN — ACETAMINOPHEN 975 MG: 325 TABLET ORAL at 18:15

## 2025-06-26 ASSESSMENT — PAIN - FUNCTIONAL ASSESSMENT: PAIN_FUNCTIONAL_ASSESSMENT: 0-10

## 2025-06-26 ASSESSMENT — PAIN SCALES - GENERAL
PAINLEVEL_OUTOF10: 8
PAINLEVEL_OUTOF10: 3

## 2025-06-27 NOTE — ED PROVIDER NOTES
HPI   Chief Complaint   Patient presents with    Generalized Body Aches     Pt states on Sunday she felt like she pulled a muscle in her left shoulder.  Since then she feels like her entire body aches.        HPI  Patient is a 29-year-old female presents ED for chief complaint of joint pain.  Patient states she feels aches and bilateral upper and lower extremities.  Denies any injury or trauma.  Denies any fever or chills.  Denies any rashes.  Denies any prior episodes of joint pain similar to this.  Denies taking any medication prior to arrival.  No other acute complaints.      Patient History   Medical History[1]  Surgical History[2]  Family History[3]  Social History[4]    Physical Exam   ED Triage Vitals [06/26/25 1746]   Temperature Heart Rate Respirations BP   37 °C (98.6 °F) (!) 118 16 (!) 179/109      Pulse Ox Temp src Heart Rate Source Patient Position   99 % -- -- --      BP Location FiO2 (%)     -- --       Physical Exam  Vitals reviewed.   Constitutional:       General: She is not in acute distress.     Appearance: Normal appearance. She is not ill-appearing.   HENT:      Head: Normocephalic and atraumatic.   Eyes:      Extraocular Movements: Extraocular movements intact.   Cardiovascular:      Rate and Rhythm: Normal rate and regular rhythm.      Heart sounds: Normal heart sounds.   Pulmonary:      Effort: Pulmonary effort is normal.      Breath sounds: Normal breath sounds.   Abdominal:      General: Abdomen is flat.   Musculoskeletal:         General: No swelling or signs of injury. Normal range of motion.      Cervical back: Normal range of motion and neck supple.      Right lower leg: No edema.      Left lower leg: No edema.   Skin:     General: Skin is warm and dry.      Findings: No rash.   Neurological:      General: No focal deficit present.      Mental Status: She is alert and oriented to person, place, and time.   Psychiatric:         Mood and Affect: Mood normal.         Behavior: Behavior  "normal.           ED Course & MDM   Diagnoses as of 06/26/25 2007   Pain in joint, multiple sites                 No data recorded     [place] Coma Scale Score: 15 (06/26/25 1748 : Hue Bruno RN)                           Medical Decision Making  Parts of this chart have been completed using voice recognition software. Please excuse any errors of transcription.  My thought process and reason for plan has been formulated from the time that I saw the patient until the time of disposition and is not specific to one specific moment during their visit and furthermore my MDM encompasses this entire chart and not only this text box.    HPI:   A medically appropriate HPI was obtained, outlined above.    Gabrielle Rosales is a  29 y.o. female    Chief Complaint   Patient presents with    Generalized Body Aches     Pt states on Sunday she felt like she pulled a muscle in her left shoulder.  Since then she feels like her entire body aches.        Medical History[5]    Surgical History[6]    Social History[7]    Family History[8]    Allergies[9]    Current Outpatient Medications   Medication Instructions    apixaban (ELIQUIS) 2.5 mg, oral, 2 times daily    buPROPion XL (WELLBUTRIN XL) 300 mg, oral, Daily before breakfast    ferrous sulfate 325 (65 Fe) MG tablet 65 mg of elemental iron, oral, Daily, for 30 day(s)    FLUoxetine (PROZAC) 40 mg, oral, Daily    MULTIVITAMIN ORAL 1 tablet, Daily, for 30 day(s)    predniSONE (DELTASONE) 50 mg, oral, Daily    traMADol (ULTRAM) 50 mg, oral, Every 6 hours PRN   for details    Exam:   Patient Vitals for the past 24 hrs:   BP Temp Pulse Resp SpO2 Height Weight   06/26/25 1758 (!) 149/105 -- 99 16 98 % -- --   06/26/25 1746 (!) 179/109 37 °C (98.6 °F) (!) 118 16 99 % 1.6 m (5' 3\") 129 kg (285 lb)       A medically appropriate exam performed, outlined above, given the known history and presentation.    EKG/Cardiac monitor:   If EKG was done and, it was interpreted by attending " physician, see their note for ED course for more detail.    Medications given during visit:  Medications   sodium chloride 0.9 % bolus 1,000 mL (0 mL intravenous Stopped 6/26/25 1858)   acetaminophen (Tylenol) tablet 975 mg (975 mg oral Given 6/26/25 1815)   orphenadrine (Norflex) injection 60 mg (60 mg intravenous Given 6/26/25 1815)        Diagnostic/tests:  Labs Reviewed   CBC WITH AUTO DIFFERENTIAL - Abnormal       Result Value    WBC 4.3 (*)     nRBC 0.0      RBC 5.09      Hemoglobin 13.9      Hematocrit 42.5      MCV 84      MCH 27.3      MCHC 32.7      RDW 13.6      Platelets 263      Neutrophils % 65.9      Immature Granulocytes %, Automated 0.7      Lymphocytes % 21.4      Monocytes % 8.2      Eosinophils % 3.1      Basophils % 0.7      Neutrophils Absolute 2.80      Immature Granulocytes Absolute, Automated 0.03      Lymphocytes Absolute 0.91 (*)     Monocytes Absolute 0.35      Eosinophils Absolute 0.13      Basophils Absolute 0.03     COMPREHENSIVE METABOLIC PANEL - Abnormal    Glucose 126 (*)     Sodium 139      Potassium 4.1      Chloride 102      Bicarbonate 27      Anion Gap 14      Urea Nitrogen 12      Creatinine 0.58      eGFR >90      Calcium 9.1      Albumin 4.3      Alkaline Phosphatase 68      Total Protein 7.3      AST 64 (*)     Bilirubin, Total 0.6      ALT 80 (*)    COAGULATION SCREEN - Abnormal    Protime 10.7      INR 1.0      aPTT 36.7 (*)     Narrative:     INR Therapeutic Range: 2.0-3.5   URINALYSIS WITH REFLEX CULTURE AND MICROSCOPIC    Narrative:     The following orders were created for panel order Urinalysis with Reflex Culture and Microscopic.  Procedure                               Abnormality         Status                     ---------                               -----------         ------                     Urinalysis with Reflex C...[[id number]]                                                 Extra Urine Gray Tube[[id number]]                                                          Please view results for these tests on the individual orders.   HCG, URINE, QUALITATIVE   URINALYSIS WITH REFLEX CULTURE AND MICROSCOPIC   EXTRA URINE GRAY TUBE        No orders to display          MDM Summary:  Unremarkable labs today.  Etiology of joint pain is unclear.  Will treat symptomatically at this time.  Return precautions were discussed.  Advised patient follow-up with primary care provider.    We have discussed the diagnosis and risks, and we agree with discharging home to follow-up with appropriate physician as directed. We also discussed returning to the Emergency Department immediately if new or worsening symptoms occur. We have discussed the symptoms which are most concerning that necessitate immediate return. Pt symptoms have been well controlled here and the patient is safe for discharge with appropriate outpatient follow up. The patient has verbalized understanding to return to ER without delay for new or worsening pains or for any other symptoms or concerns. I utilized the discharge clinical management tool provided [employer] to help estimate risk of negative outcome for this patient.        Disposition:  ED Prescriptions       Medication Sig Dispense Start Date End Date Auth. Provider    predniSONE (Deltasone) 50 mg tablet Take 1 tablet (50 mg) by mouth once daily for 5 days. 5 tablet 6/26/2025 7/1/2025 Bran Thompson PA-C    traMADol (Ultram) 50 mg tablet Take 1 tablet (50 mg) by mouth every 6 hours if needed for severe pain (7 - 10) for up to 3 days. 12 tablet 6/26/2025 6/29/2025 Bran Thompson PA-C            All of the patient's questions were answered to the best of my ability. Patient states understanding that they have been screened for an emergency today and we have not found any etiology of symptoms that requires emergent treatment or admission to the hospital at this point. They understand that they have not had definitive care day and require follow-up for  treatment of their condition. They also state understanding that they may have an emergent condition that may potentially have not of detected at this visit and they must return to the emergency department if they develop any worsening of symptoms or new complaints.       Procedure  Procedures       [1]   Past Medical History:  Diagnosis Date    Acanthosis nigricans 2014    Acrochordon 2014    Acute suppurative otitis media with spontaneous rupture of ear drum 2025    Blood disorder     Candidiasis of skin 2025    Finding of above normal blood pressure 2025    Hypertrophy of tonsils 2025    Knee pain 2025    Multiple benign nevi 2014    Other pericardial effusion (noninflammatory) ([facility]) 2021    Pericardial effusion    Other specified health status     No pertinent past medical history    Personal history of diseases of the blood and blood-forming organs and certain disorders involving the immune mechanism     History of bleeding disorder    Postoperative fever 2025    Rash 2014    Snoring 2025    Sprain of ankle 2025    Wound dehiscence,  ([facility]) 2025   [2]   Past Surgical History:  Procedure Laterality Date     SECTION, CLASSIC       Section     SECTION, LOW TRANSVERSE      HYSTERECTOMY      OTHER SURGICAL HISTORY  2021    Knee surgery   [3]   Family History  Adopted: Yes   Problem Relation Name Age of Onset    No Known Problems Child      No Known Problems Other     [4]   Social History  Tobacco Use    Smoking status: Never    Smokeless tobacco: Never   Vaping Use    Vaping status: Never Used   Substance Use Topics    Alcohol use: Not Currently    Drug use: Never   [5]   Past Medical History:  Diagnosis Date    Acanthosis nigricans 2014    Acrochordon 2014    Acute suppurative otitis media with spontaneous rupture of ear drum 2025    Blood disorder      Candidiasis of skin 2025    Finding of above normal blood pressure 2025    Hypertrophy of tonsils 2025    Knee pain 2025    Multiple benign nevi 2014    Other pericardial effusion (noninflammatory) ([facility]) 2021    Pericardial effusion    Other specified health status     No pertinent past medical history    Personal history of diseases of the blood and blood-forming organs and certain disorders involving the immune mechanism     History of bleeding disorder    Postoperative fever 2025    Rash 2014    Snoring 2025    Sprain of ankle 2025    Wound dehiscence,  ([facility]) 2025   [6]   Past Surgical History:  Procedure Laterality Date     SECTION, CLASSIC       Section     SECTION, LOW TRANSVERSE      HYSTERECTOMY      OTHER SURGICAL HISTORY  2021    Knee surgery   [7]   Social History  Tobacco Use    Smoking status: Never    Smokeless tobacco: Never   Vaping Use    Vaping status: Never Used   Substance Use Topics    Alcohol use: Not Currently    Drug use: Never   [8]   Family History  Adopted: Yes   Problem Relation Name Age of Onset    No Known Problems Child      No Known Problems Other     [9] No Known Allergies       Bran Thompson PA-C  26

## 2025-08-19 ENCOUNTER — APPOINTMENT (OUTPATIENT)
Dept: NUTRITION | Facility: HOSPITAL | Age: 30
End: 2025-08-19
Payer: COMMERCIAL

## 2025-08-19 DIAGNOSIS — E66.01 MORBID (SEVERE) OBESITY DUE TO EXCESS CALORIES (MULTI): Primary | ICD-10-CM

## 2025-08-19 PROCEDURE — 97802 MEDICAL NUTRITION INDIV IN: CPT

## 2025-08-20 VITALS — BODY MASS INDEX: 51.38 KG/M2 | HEIGHT: 63 IN | WEIGHT: 290 LBS

## 2025-09-17 ENCOUNTER — APPOINTMENT (OUTPATIENT)
Dept: PRIMARY CARE | Facility: CLINIC | Age: 30
End: 2025-09-17
Payer: COMMERCIAL